# Patient Record
Sex: FEMALE | Race: WHITE | NOT HISPANIC OR LATINO | Employment: FULL TIME | URBAN - METROPOLITAN AREA
[De-identification: names, ages, dates, MRNs, and addresses within clinical notes are randomized per-mention and may not be internally consistent; named-entity substitution may affect disease eponyms.]

---

## 2017-11-07 ENCOUNTER — TRANSFERRED RECORDS (OUTPATIENT)
Dept: HEALTH INFORMATION MANAGEMENT | Facility: CLINIC | Age: 57
End: 2017-11-07

## 2017-11-07 LAB
CHOLEST SERPL-MCNC: 243 MG/DL
GLUCOSE SERPL-MCNC: 91 MG/DL (ref 65–99)
HBA1C MFR BLD: 5.5 % (ref 4–6)
HDLC SERPL-MCNC: 113 MG/DL
LDLC SERPL CALC-MCNC: 115 MG/DL
NONHDLC SERPL-MCNC: 130 MG/DL
TRIGL SERPL-MCNC: 62 MG/DL
TSH SERPL-ACNC: 2.56 UIU/ML (ref 0.3–5)

## 2017-12-15 DIAGNOSIS — F33.42 RECURRENT MAJOR DEPRESSIVE DISORDER, IN FULL REMISSION (H): ICD-10-CM

## 2017-12-15 RX ORDER — BUPROPION HYDROCHLORIDE 300 MG/1
TABLET ORAL
Qty: 90 TABLET | Refills: 0 | Status: SHIPPED | OUTPATIENT
Start: 2017-12-15 | End: 2017-12-22

## 2017-12-15 NOTE — TELEPHONE ENCOUNTER
wellbutrin XL       Last Written Prescription Date:  12/20/16  Last Fill Quantity: 90,   # refills: 3  Last Office Visit: 12/20/16  Future Office visit:    Next 5 appointments (look out 90 days)     Dec 22, 2017 11:30 AM CST   PHYSICAL with Moreno Hagan MD   [facility] ([facility])    [address] [address]   [phone]                   Medication is being filled for 1 time refill only due to:  Patient needs to be seen because it has been more than one year since last visit.   Pt due for annual, appt scheduled,3 month supply sent for insurance purposes.

## 2017-12-22 ENCOUNTER — OFFICE VISIT (OUTPATIENT)
Dept: OBGYN | Facility: CLINIC | Age: 57
End: 2017-12-22
Payer: COMMERCIAL

## 2017-12-22 ENCOUNTER — RADIANT APPOINTMENT (OUTPATIENT)
Dept: MAMMOGRAPHY | Facility: CLINIC | Age: 57
End: 2017-12-22
Payer: COMMERCIAL

## 2017-12-22 VITALS
SYSTOLIC BLOOD PRESSURE: 104 MMHG | DIASTOLIC BLOOD PRESSURE: 72 MMHG | BODY MASS INDEX: 27.82 KG/M2 | HEART RATE: 76 BPM | WEIGHT: 167 LBS | HEIGHT: 65 IN

## 2017-12-22 DIAGNOSIS — D03.30 MELANOMA IN SITU OF FACE EXCLUDING EYELID, NOSE, LIP, AND EAR (H): ICD-10-CM

## 2017-12-22 DIAGNOSIS — Z12.31 VISIT FOR SCREENING MAMMOGRAM: ICD-10-CM

## 2017-12-22 DIAGNOSIS — Z01.419 ENCOUNTER FOR GYNECOLOGICAL EXAMINATION WITHOUT ABNORMAL FINDING: Primary | ICD-10-CM

## 2017-12-22 DIAGNOSIS — F33.42 RECURRENT MAJOR DEPRESSIVE DISORDER, IN FULL REMISSION (H): ICD-10-CM

## 2017-12-22 PROCEDURE — 77063 BREAST TOMOSYNTHESIS BI: CPT | Mod: TC

## 2017-12-22 PROCEDURE — 87624 HPV HI-RISK TYP POOLED RSLT: CPT | Performed by: OBSTETRICS & GYNECOLOGY

## 2017-12-22 PROCEDURE — G0202 SCR MAMMO BI INCL CAD: HCPCS | Mod: TC

## 2017-12-22 PROCEDURE — 99396 PREV VISIT EST AGE 40-64: CPT | Performed by: OBSTETRICS & GYNECOLOGY

## 2017-12-22 PROCEDURE — G0145 SCR C/V CYTO,THINLAYER,RESCR: HCPCS | Performed by: OBSTETRICS & GYNECOLOGY

## 2017-12-22 RX ORDER — BUPROPION HYDROCHLORIDE 300 MG/1
300 TABLET ORAL EVERY MORNING
Qty: 90 TABLET | Refills: 3 | Status: SHIPPED | OUTPATIENT
Start: 2017-12-22 | End: 2019-01-08

## 2017-12-22 ASSESSMENT — PATIENT HEALTH QUESTIONNAIRE - PHQ9
5. POOR APPETITE OR OVEREATING: NOT AT ALL
SUM OF ALL RESPONSES TO PHQ QUESTIONS 1-9: 0

## 2017-12-22 ASSESSMENT — ANXIETY QUESTIONNAIRES
5. BEING SO RESTLESS THAT IT IS HARD TO SIT STILL: NOT AT ALL
7. FEELING AFRAID AS IF SOMETHING AWFUL MIGHT HAPPEN: NOT AT ALL
6. BECOMING EASILY ANNOYED OR IRRITABLE: SEVERAL DAYS
2. NOT BEING ABLE TO STOP OR CONTROL WORRYING: SEVERAL DAYS
GAD7 TOTAL SCORE: 3
3. WORRYING TOO MUCH ABOUT DIFFERENT THINGS: NOT AT ALL
IF YOU CHECKED OFF ANY PROBLEMS ON THIS QUESTIONNAIRE, HOW DIFFICULT HAVE THESE PROBLEMS MADE IT FOR YOU TO DO YOUR WORK, TAKE CARE OF THINGS AT HOME, OR GET ALONG WITH OTHER PEOPLE: NOT DIFFICULT AT ALL
1. FEELING NERVOUS, ANXIOUS, OR ON EDGE: SEVERAL DAYS

## 2017-12-22 NOTE — PROGRESS NOTES
Faustina is a 57 year old  female who presents for annual exam.     Besides routine health maintenance, she has no other health concerns today .    HPI:  The patient's PCP is [facility].    Doing well. Had Dx this year of Melanoma in Situ on Face. Had mohs surgery. No further treatment.  Seeing Derm every 6 months.   Sees Tam at Endocrine. Labs good. Lipids normal.    Mood good on Wellbutrin. No side effects.       GYNECOLOGIC HISTORY:    No LMP recorded. Patient is postmenopausal.  Her current contraception method is: none.  She  reports that she has never smoked. She has never used smokeless tobacco.      Patient is sexually active.  STD testing offered?  Declined  Last PHQ-9 score on record =   PHQ-9 SCORE 2017   Total Score 0     Last GAD7 score on record =   LOW-7 SCORE 2017   Total Score 3     Alcohol Score = 4    HEALTH MAINTENANCE:  Cholesterol: HDL of 113  Cholesterol   Date Value Ref Range Status   2017 243 (H) <200 mg/dL Final   2015 259 (H) <200 mg/dL Final     Comment:     Desirable:       <200 mg/dl      Last Mammo: 2013, Result: normal, Next Mammo: today   Pap: 2012 Neg, HPV Neg  Colonoscopy:  2009, Result: normal, Next Colonoscopy: 2 years.  Dexa:  NA    Health maintenance updated:  yes    HISTORY:  Obstetric History       T2      L2     SAB2   TAB0   Ectopic0   Multiple0   Live Births0       # Outcome Date GA Lbr Nadir/2nd Weight Sex Delivery Anes PTL Lv   4 Term            3 Term            2 SAB            1 SAB                   Patient Active Problem List   Diagnosis     Allergic rhinitis due to pollen     Recurrent major depressive disorder, in full remission (H)     Melanoma in situ of face excluding eyelid, nose, lip, and ear (H)     Past Surgical History:   Procedure Laterality Date     BIOPSY BREAST       EYE SURGERY      retinal detachment      Social History   Substance Use Topics     Smoking status: Never  "Smoker     Smokeless tobacco: Never Used     Alcohol use 0.0 oz/week     0 Standard drinks or equivalent per week      Comment: daily 1-2 per time       Problem (# of Occurrences) Relation (Name,Age of Onset)    Alzheimer Disease (1) Other    DIABETES (1) Other    HEART DISEASE (1) Father (50)    Hyperlipidemia (1) Other    Hypertension (1) Father            Current Outpatient Prescriptions   Medication Sig     buPROPion (WELLBUTRIN XL) 300 MG 24 hr tablet Take 1 tablet (300 mg) by mouth every morning     Omega-3 Fatty Acids (OMEGA-3 FISH OIL PO)      ASPIRIN PO Take 81 mg by mouth     budesonide (RINOCORT AQUA) 32 MCG/ACT nasal spray Spray 1 spray into both nostrils daily     Calcium Carbonate-Vitamin D (CALCIUM + D PO)      MULTIPLE VITAMIN PO      [DISCONTINUED] buPROPion (WELLBUTRIN XL) 300 MG 24 hr tablet TAKE 1 TABLET (300 MG) BY MOUTH EVERY MORNING     No current facility-administered medications for this visit.      Allergies   Allergen Reactions     Bacitracin-Neomycin-Polymyxin      PN: LW Reaction: Contact Dermatitis       Past medical, surgical, social and family histories were reviewed and updated in EPIC.    ROS:   12 point review of systems negative other than symptoms noted below.    EXAM:  /72  Pulse 76  Ht 5' 4.5\" (1.638 m)  Wt 167 lb (75.8 kg)  Breastfeeding? No  BMI 28.22 kg/m2   BMI: Body mass index is 28.22 kg/(m^2).    PHYSICAL EXAM:  Constitutional:  Appearance: Well nourished, well developed, alert, in no acute distress  Neck:  Lymph Nodes:  No lymphadenopathy present    Thyroid:  Gland size normal, nontender, no nodules or masses present  on palpation  Chest:  Respiratory Effort:  Breathing unlabored  Cardiovascular:    Heart: Auscultation:  Regular rate, normal rhythm, no murmurs present  Breasts: Inspection of Breasts:  No lymphadenopathy present., Palpation of Breasts and Axillae:  No masses present on palpation, no breast tenderness., Axillary Lymph Nodes:  No lymphadenopathy " present. and No nodularity, asymmetry or nipple discharge bilaterally.  Gastrointestinal:   Abdominal Examination:  Abdomen nontender to palpation, tone normal without rigidity or guarding, no masses present, umbilicus without lesions   Liver and Spleen:  No hepatomegaly present, liver nontender to palpation    Hernias:  No hernias present  Lymphatic: Lymph Nodes:  No other lymphadenopathy present  Skin:  General Inspection:  No rashes present, no lesions present, no areas of  discoloration    Genitalia and Groin:  No rashes present, no lesions present, no areas of  discoloration, no masses present  Neurologic/Psychiatric:    Mental Status:  Oriented X3     Pelvic Exam:  External Genitalia:     Normal appearance for age, no discharge present, no tenderness present, no inflammatory lesions present, color normal  Vagina:     Normal vaginal vault without central or paravaginal defects, no discharge present, no inflammatory lesions present, no masses present  Bladder:     Nontender to palpation  Urethra:   Urethral Body:  Urethra palpation normal, urethra structural support normal   Urethral Meatus:  No erythema or lesions present  Cervix:     Appearance healthy, no lesions present, nontender to palpation, no bleeding present  Uterus:     Uterus: firm, normal sized and nontender, anteverted in position.   Adnexa:     No adnexal tenderness present, no adnexal masses present  Perineum:     Perineum within normal limits, no evidence of trauma, no rashes or skin lesions present  Anus:     Anus within normal limits, no hemorrhoids present  Inguinal Lymph Nodes:     No lymphadenopathy present  Pubic Hair:     Normal pubic hair distribution for age  Genitalia and Groin:     No rashes present, no lesions present, no areas of discoloration, no masses present      COUNSELING:   Reviewed preventive health counseling, as reflected in patient instructions       Regular exercise    BMI: Body mass index is 28.22 kg/(m^2).  Weight  management plan: Encouraged weight loss    ASSESSMENT:  57 year old female with satisfactory annual exam.    ICD-10-CM    1. Encounter for gynecological examination without abnormal finding Z01.419 Pap imaged thin layer screen with HPV - recommended age 30 - 65     HPV High Risk Types DNA Cervical   2. Recurrent major depressive disorder, in full remission (H) F33.42 buPROPion (WELLBUTRIN XL) 300 MG 24 hr tablet   3. Melanoma in situ of face excluding eyelid, nose, lip, and ear (H) D03.30        PLAN:  Refill meds. RTC 1 year.    Moreno Hagan MD

## 2017-12-22 NOTE — LETTER
January 6, 2018    Faustina Parks Carmen  [address] [place] [place] [address]    Dear Faustina,  We are happy to inform you that your PAP smear result from 12/22/17 is normal.  We are now able to do a follow up test on PAP smears. The DNA test is for HPV (Human Papilloma Virus). Cervical cancer is closely linked with certain types of HPV. Your result showed no evidence of high risk HPV.  Therefore we recommend you return in 3 years for your next pap smear.  You will still need to return to the clinic every year for an annual exam and other preventive tests.  Please contact the clinic at [phone] with any questions.  Sincerely,    Moreno Hagan MD/moe

## 2017-12-23 ASSESSMENT — ANXIETY QUESTIONNAIRES: GAD7 TOTAL SCORE: 3

## 2017-12-27 LAB
COPATH REPORT: NORMAL
PAP: NORMAL

## 2017-12-28 ENCOUNTER — HOSPITAL ENCOUNTER (OUTPATIENT)
Dept: MAMMOGRAPHY | Facility: CLINIC | Age: 57
Discharge: HOME | End: 2017-12-28
Attending: OBSTETRICS & GYNECOLOGY | Admitting: OBSTETRICS & GYNECOLOGY
Payer: COMMERCIAL

## 2017-12-28 DIAGNOSIS — R92.8 ABNORMAL MAMMOGRAM: ICD-10-CM

## 2017-12-28 PROCEDURE — 76642 ULTRASOUND BREAST LIMITED: CPT | Mod: LT

## 2017-12-29 LAB
FINAL DIAGNOSIS: NORMAL
HPV HR 12 DNA CVX QL NAA+PROBE: NEGATIVE
HPV16 DNA SPEC QL NAA+PROBE: NEGATIVE
HPV18 DNA SPEC QL NAA+PROBE: NEGATIVE
SPECIMEN DESCRIPTION: NORMAL

## 2018-04-09 ENCOUNTER — TELEPHONE (OUTPATIENT)
Dept: OBGYN | Facility: CLINIC | Age: 58
End: 2018-04-09

## 2018-04-09 NOTE — TELEPHONE ENCOUNTER
12/22/17 Annual Dentist found lump under her ear. Pt was wondering if she could come in and be seen for this. Advised pt that it is more an PCP or internal clinic appointment. Pt states she made an appointment with ENT an wondered if this was right. Informed that pt could be seen by ENT. Offered [facility] number, pt decline. Will keep appointment with ENT.

## 2018-04-09 NOTE — TELEPHONE ENCOUNTER
Patient has lump on neck that was found by her dentist, wants to know if she can see Dr. Hagan regarding this or if she needs to see another provider.

## 2018-12-07 ENCOUNTER — TRANSFERRED RECORDS (OUTPATIENT)
Dept: HEALTH INFORMATION MANAGEMENT | Facility: CLINIC | Age: 58
End: 2018-12-07

## 2019-01-07 NOTE — PROGRESS NOTES
Faustina is a 58 year old  female who presents for annual exam.     Besides routine health maintenance, she has no other health concerns today .    HPI:  The patient's PCP is  [facility].    Having more anxiety which results in tremor.  Starting to become an issue.   Has been on Wellbutrin for years.     Labs at work. High HDL    No GYN issues.   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      GYNECOLOGIC HISTORY:    No LMP recorded. Patient is postmenopausal.  Her current contraception method is: menopause.  She  reports that  has never smoked. she has never used smokeless tobacco.    Patient is sexually active.  STD testing offered?  Declined  Last PHQ-9 score on record =   PHQ-9 SCORE 2019   PHQ-9 Total Score 5     Last GAD7 score on record =   LOW-7 SCORE 2019   Total Score 6     Alcohol Score = 4    HEALTH MAINTENANCE:  Cholesterol: 2017   Total= 243, Triglycerides=62, GIB=107, VSR=481  Cholesterol   Date Value Ref Range Status   2017 243 (H) <200 mg/dL Final   2015 259 (H) <200 mg/dL Final     Comment:     Desirable:       <200 mg/dl      Last Mammo: 2017, Result: normal, Next Mammo: today   Pap: 2017 NIL, HPV-  Lab Results   Component Value Date    PAP NIL 2017      Colonoscopy:  2009, Result: normal, Next Colonoscopy: 10 years.  Dexa:  NA    Health maintenance updated:  yes    HISTORY:  Obstetric History       T2      L2     SAB2   TAB0   Ectopic0   Multiple0   Live Births0       # Outcome Date GA Lbr Nadir/2nd Weight Sex Delivery Anes PTL Lv   4 Term            3 Term            2 SAB            1 SAB                   Patient Active Problem List   Diagnosis     Allergic rhinitis due to pollen     Recurrent major depressive disorder, in full remission (H)     Melanoma in situ of face excluding eyelid, nose, lip, and ear (H)     Past Surgical History:   Procedure Laterality Date     BIOPSY BREAST       EYE  "SURGERY      retinal detachment      Social History     Tobacco Use     Smoking status: Never Smoker     Smokeless tobacco: Never Used   Substance Use Topics     Alcohol use: Yes     Alcohol/week: 0.0 oz     Comment: daily 1-2 per time       Problem (# of Occurrences) Relation (Name,Age of Onset)    Alzheimer Disease (1) Other    Diabetes (1) Other    Heart Disease (1) Father (50)    Hyperlipidemia (1) Other    Hypertension (1) Father            Current Outpatient Medications   Medication Sig     ASPIRIN PO Take 81 mg by mouth     budesonide (RINOCORT AQUA) 32 MCG/ACT nasal spray Spray 1 spray into both nostrils daily     buPROPion (WELLBUTRIN XL) 300 MG 24 hr tablet Take 1 tablet (300 mg) by mouth every morning     Calcium Carbonate-Vitamin D (CALCIUM + D PO)      escitalopram (LEXAPRO) 10 MG tablet Take 1 tablet (10 mg) by mouth daily     MULTIPLE VITAMIN PO      Omega-3 Fatty Acids (OMEGA-3 FISH OIL PO)      No current facility-administered medications for this visit.      Allergies   Allergen Reactions     Bacitracin-Neomycin-Polymyxin      PN: LW Reaction: Contact Dermatitis       Past medical, surgical, social and family histories were reviewed and updated in EPIC.    ROS:   12 point review of systems negative other than symptoms noted below.  Genitourinary: Vaginal Dryness and Vaginal Itching  Psychiatric: Anxiety    EXAM:  /70   Pulse 74   Ht 1.626 m (5' 4\")   Wt 78.2 kg (172 lb 6.4 oz)   Breastfeeding? No   BMI 29.59 kg/m     BMI: Body mass index is 29.59 kg/m .    PHYSICAL EXAM:  Constitutional:  Appearance: Well nourished, well developed, alert, in no acute distress  Neck:  Lymph Nodes:  No lymphadenopathy present    Thyroid:  Gland size normal, nontender, no nodules or masses present  on palpation  Chest:  Respiratory Effort:  Breathing unlabored  Cardiovascular:    Heart: Auscultation:  Regular rate, normal rhythm, no murmurs present  Breasts: Inspection of Breasts:  No lymphadenopathy " present., Palpation of Breasts and Axillae:  No masses present on palpation, no breast tenderness., Axillary Lymph Nodes:  No lymphadenopathy present. and No nodularity, asymmetry or nipple discharge bilaterally.  Gastrointestinal:   Abdominal Examination:  Abdomen nontender to palpation, tone normal without rigidity or guarding, no masses present, umbilicus without lesions   Liver and Spleen:  No hepatomegaly present, liver nontender to palpation    Hernias:  No hernias present  Lymphatic: Lymph Nodes:  No other lymphadenopathy present  Skin:  General Inspection:  No rashes present, no lesions present, no areas of  discoloration    Genitalia and Groin:  No rashes present, no lesions present, no areas of  discoloration, no masses present  Neurologic/Psychiatric:    Mental Status:  Oriented X3     Pelvic Exam:  External Genitalia:     Normal appearance for age, no discharge present, no tenderness present, no inflammatory lesions present, color normal  Vagina:     Normal vaginal vault without central or paravaginal defects, no discharge present, no inflammatory lesions present, no masses present  Bladder:     Nontender to palpation  Urethra:   Urethral Body:  Urethra palpation normal, urethra structural support normal   Urethral Meatus:  No erythema or lesions present  Cervix:     Appearance healthy, no lesions present, nontender to palpation, no bleeding present  Uterus:     Uterus: firm, normal sized and nontender, anteverted in position.   Adnexa:     No adnexal tenderness present, no adnexal masses present  Perineum:     Perineum within normal limits, no evidence of trauma, no rashes or skin lesions present  Anus:     Anus within normal limits, no hemorrhoids present  Inguinal Lymph Nodes:     No lymphadenopathy present  Pubic Hair:     Normal pubic hair distribution for age  Genitalia and Groin:     No rashes present, no lesions present, no areas of discoloration, no masses present      COUNSELING:   Reviewed  preventive health counseling, as reflected in patient instructions       Regular exercise    BMI: Body mass index is 29.59 kg/m .  Weight management plan: Encouraged weight loss    ASSESSMENT:  58 year old female with satisfactory annual exam.    ICD-10-CM    1. Encounter for gynecological examination without abnormal finding Z01.419    2. Recurrent major depressive disorder, in full remission (H) F33.42 buPROPion (WELLBUTRIN XL) 300 MG 24 hr tablet     escitalopram (LEXAPRO) 10 MG tablet   3. Need for shingles vaccine Z23 ZOSTER VACCINE RECOMBINANT ADJUVANTED IM NJX     ADMIN 1st VACCINE     ADMIN 1st VACCINE     ZOSTER VACCINE RECOMBINANT ADJUVANTED IM NJX       PLAN:  Will add Lexapro to the wellbutrin for anxiety. PHQ-9 is not great so could benefit from both. Will call after Rx to let me know how shes doing.   ShingRix today. Will RTC in a few months for second injection.     Moreno Hagan MD

## 2019-01-08 ENCOUNTER — OFFICE VISIT (OUTPATIENT)
Dept: OBGYN | Facility: CLINIC | Age: 59
End: 2019-01-08
Payer: COMMERCIAL

## 2019-01-08 ENCOUNTER — ANCILLARY PROCEDURE (OUTPATIENT)
Dept: MAMMOGRAPHY | Facility: CLINIC | Age: 59
End: 2019-01-08
Payer: COMMERCIAL

## 2019-01-08 VITALS
WEIGHT: 172.4 LBS | SYSTOLIC BLOOD PRESSURE: 118 MMHG | BODY MASS INDEX: 29.43 KG/M2 | DIASTOLIC BLOOD PRESSURE: 70 MMHG | HEIGHT: 64 IN | HEART RATE: 74 BPM

## 2019-01-08 DIAGNOSIS — Z12.31 VISIT FOR SCREENING MAMMOGRAM: ICD-10-CM

## 2019-01-08 DIAGNOSIS — Z23 NEED FOR SHINGLES VACCINE: ICD-10-CM

## 2019-01-08 DIAGNOSIS — Z01.419 ENCOUNTER FOR GYNECOLOGICAL EXAMINATION WITHOUT ABNORMAL FINDING: Primary | ICD-10-CM

## 2019-01-08 DIAGNOSIS — F33.42 RECURRENT MAJOR DEPRESSIVE DISORDER, IN FULL REMISSION (H): ICD-10-CM

## 2019-01-08 PROCEDURE — 90750 HZV VACC RECOMBINANT IM: CPT | Performed by: OBSTETRICS & GYNECOLOGY

## 2019-01-08 PROCEDURE — 99396 PREV VISIT EST AGE 40-64: CPT | Mod: 25 | Performed by: OBSTETRICS & GYNECOLOGY

## 2019-01-08 PROCEDURE — 77063 BREAST TOMOSYNTHESIS BI: CPT | Mod: TC

## 2019-01-08 PROCEDURE — 90471 IMMUNIZATION ADMIN: CPT | Performed by: OBSTETRICS & GYNECOLOGY

## 2019-01-08 PROCEDURE — 77067 SCR MAMMO BI INCL CAD: CPT | Mod: TC

## 2019-01-08 RX ORDER — ESCITALOPRAM OXALATE 10 MG/1
10 TABLET ORAL DAILY
Qty: 90 TABLET | Refills: 0 | Status: SHIPPED | OUTPATIENT
Start: 2019-01-08 | End: 2019-03-29

## 2019-01-08 RX ORDER — BUPROPION HYDROCHLORIDE 300 MG/1
300 TABLET ORAL EVERY MORNING
Qty: 90 TABLET | Refills: 3 | Status: SHIPPED | OUTPATIENT
Start: 2019-01-08 | End: 2020-01-24

## 2019-01-08 ASSESSMENT — ANXIETY QUESTIONNAIRES
IF YOU CHECKED OFF ANY PROBLEMS ON THIS QUESTIONNAIRE, HOW DIFFICULT HAVE THESE PROBLEMS MADE IT FOR YOU TO DO YOUR WORK, TAKE CARE OF THINGS AT HOME, OR GET ALONG WITH OTHER PEOPLE: NOT DIFFICULT AT ALL
5. BEING SO RESTLESS THAT IT IS HARD TO SIT STILL: SEVERAL DAYS
7. FEELING AFRAID AS IF SOMETHING AWFUL MIGHT HAPPEN: SEVERAL DAYS
6. BECOMING EASILY ANNOYED OR IRRITABLE: SEVERAL DAYS
1. FEELING NERVOUS, ANXIOUS, OR ON EDGE: SEVERAL DAYS
3. WORRYING TOO MUCH ABOUT DIFFERENT THINGS: SEVERAL DAYS
2. NOT BEING ABLE TO STOP OR CONTROL WORRYING: NOT AT ALL
GAD7 TOTAL SCORE: 6

## 2019-01-08 ASSESSMENT — PATIENT HEALTH QUESTIONNAIRE - PHQ9
5. POOR APPETITE OR OVEREATING: SEVERAL DAYS
SUM OF ALL RESPONSES TO PHQ QUESTIONS 1-9: 5

## 2019-01-08 ASSESSMENT — MIFFLIN-ST. JEOR: SCORE: 1337

## 2019-01-09 ASSESSMENT — ANXIETY QUESTIONNAIRES: GAD7 TOTAL SCORE: 6

## 2019-03-29 ENCOUNTER — TELEPHONE (OUTPATIENT)
Dept: OBGYN | Facility: CLINIC | Age: 59
End: 2019-03-29

## 2019-03-29 DIAGNOSIS — F33.42 RECURRENT MAJOR DEPRESSIVE DISORDER, IN FULL REMISSION (H): ICD-10-CM

## 2019-03-29 RX ORDER — ESCITALOPRAM OXALATE 10 MG/1
10 TABLET ORAL DAILY
Qty: 90 TABLET | Refills: 3 | Status: SHIPPED | OUTPATIENT
Start: 2019-03-29 | End: 2020-01-24

## 2019-03-29 NOTE — TELEPHONE ENCOUNTER
CAlled and left kandy for the pt that her med has been sent to the pharmacy and that she can schedule a shingles booster anytime after April 29th ( Okay per tejas )

## 2019-03-29 NOTE — TELEPHONE ENCOUNTER
Patient calling with update for Lexapro trial, she reports it is working well and would like to continue the rx.  [facility] [address].

## 2019-03-29 NOTE — TELEPHONE ENCOUNTER
Called the pt- She says she feels very good since starting the lexapro. Would like a refill. [facility] [address]    Routing to Dr ALLEN- Okay to reorder?      (Pt questioning when she can come in for her 2nd booster for shingles. Is aware of shortage.  I discussed w/ Kayln in Lab- Pt should sched week of April 29th.- Pls pass info onto pt after Dr ALLEN responds)

## 2019-04-15 DIAGNOSIS — R05.9 COUGH: Primary | ICD-10-CM

## 2019-04-15 RX ORDER — BENZONATATE 100 MG/1
100 CAPSULE ORAL 3 TIMES DAILY PRN
Qty: 21 CAPSULE | Refills: 0 | Status: SHIPPED | OUTPATIENT
Start: 2019-04-15 | End: 2020-01-24

## 2019-04-15 NOTE — TELEPHONE ENCOUNTER
Pt has a bad cough and cold. Some sinus drainage. Cough not productive, no fever. Pt is requesting refill of cough med she received a couple of years ago [facility]. Benzonatate 100 mg capsules. Pt states it worked well for her. Routing to Dr. Tristan, on call. Please advise.

## 2019-04-19 ENCOUNTER — TELEPHONE (OUTPATIENT)
Dept: OBGYN | Facility: CLINIC | Age: 59
End: 2019-04-19

## 2019-04-19 NOTE — TELEPHONE ENCOUNTER
Pt had shingles shot at [facility] yesterday. Has redness at site and some bruising. Pt informed that bruising is from small vessel hit with injection and will go away. Pt does full range of motion in her arm. Pt informed normal reaction. Can just monitor for now, use heat to site. Can try ice if heat not helpful. Pt to call back if develops SOB, Limited range of motion in arm, fever or increased redness. No further questions.

## 2019-06-24 ENCOUNTER — TRANSFERRED RECORDS (OUTPATIENT)
Dept: HEALTH INFORMATION MANAGEMENT | Facility: CLINIC | Age: 59
End: 2019-06-24

## 2019-10-18 ENCOUNTER — TELEPHONE (OUTPATIENT)
Dept: OBGYN | Facility: CLINIC | Age: 59
End: 2019-10-18

## 2019-10-18 DIAGNOSIS — R05.9 COUGH: Primary | ICD-10-CM

## 2019-10-18 RX ORDER — BENZONATATE 100 MG/1
100 CAPSULE ORAL 3 TIMES DAILY PRN
Qty: 21 CAPSULE | Refills: 0 | Status: SHIPPED | OUTPATIENT
Start: 2019-10-18 | End: 2020-01-24

## 2019-10-18 NOTE — TELEPHONE ENCOUNTER
Requesting a prescription (refill) for Benzonatate 100mg tablet for her cough. [facility] on [address]. Please call patient @ [phone]

## 2019-10-18 NOTE — TELEPHONE ENCOUNTER
Symptoms of sore throat, nasal congestion, cough-non productive, denies fever. Cough is the most bothersome. Has used Benzonatate in the past with good results.     Norma Alvarez RN on 10/18/2019 at 11:09 AM

## 2019-12-05 ENCOUNTER — TRANSFERRED RECORDS (OUTPATIENT)
Dept: HEALTH INFORMATION MANAGEMENT | Facility: CLINIC | Age: 59
End: 2019-12-05

## 2019-12-05 LAB
CHOLEST SERPL-MCNC: 260 MG/DL
GLUCOSE SERPL-MCNC: 90 MG/DL (ref 65–99)
HBA1C MFR BLD: 6 % (ref 4–6)
HDLC SERPL-MCNC: 102 MG/DL
LDLC SERPL CALC-MCNC: 143 MG/DL
NONHDLC SERPL-MCNC: 158 MG/DL
TRIGL SERPL-MCNC: 49 MG/DL
TSH SERPL-ACNC: 3.19 UIU/ML (ref 0.3–5)

## 2020-01-23 NOTE — PROGRESS NOTES
Faustina is a 59 year old  female who presents for annual exam.     Besides routine health maintenance, last year added Lexapro to Wellbutrin which has helped, but still feels shaky at times and unsure why.    HPI:    Doing well. Mood good with Lexapro added to Wellbutrin.   Notes tremor over the last 2 years. Has been on Wellbutrin much longer than that. Also notes brothers have same tremor.  No GYN complaints. Bladder good.     Sees endocrine for goiter. Does lipids.   Renting condo in  [place] for a month in March.       GYNECOLOGIC HISTORY:    No LMP recorded. Patient is postmenopausal.  She  reports that she has never smoked. She has never used smokeless tobacco.  Patient is not sexually active.  STD testing offered?  Declined     Last PHQ-9 score on record =   PHQ-9 SCORE 2020   PHQ-9 Total Score 0     Last GAD7 score on record =   LOW-7 SCORE 2020   Total Score 0     Alcohol Score = 4    HEALTH MAINTENANCE:  Cholesterol:   Recent Labs   Lab Test 19   CHOL 260* 243*    113   * 115*   TRIG 49 62   GLUCOSE 90 91   Last Mammo: 19, Result: Normal, Next Mammo: Today   Pap:   Lab Results   Component Value Date    PAP NIL, NEG-HPV 2017   Colonoscopy:  19, Result: Normal, but poor prep, Next Colonoscopy: 5 years.  Dexa:  never  Health maintenance updated:  yes    HISTORY:  OB History    Para Term  AB Living   4 2 2 0 2 2   SAB TAB Ectopic Multiple Live Births   2 0 0 0 0      # Outcome Date GA Lbr Nadir/2nd Weight Sex Delivery Anes PTL Lv   4 Term            3 Term            2 SAB            1 SAB                Patient Active Problem List   Diagnosis     Allergic rhinitis due to pollen     Recurrent major depressive disorder, in full remission (H)     Melanoma in situ of face excluding eyelid, nose, lip, and ear (H)     Past Surgical History:   Procedure Laterality Date     BIOPSY BREAST       EYE SURGERY      retinal detachment     PAROTIDECTOMY  "Left 2018      Social History     Tobacco Use     Smoking status: Never Smoker     Smokeless tobacco: Never Used   Substance Use Topics     Alcohol use: Yes     Alcohol/week: 0.0 standard drinks     Comment: daily 1-2 per time       Problem (# of Occurrences) Relation (Name,Age of Onset)    Alzheimer Disease (1) Other    Diabetes (1) Other    Heart Disease (1) Father (50)    Hyperlipidemia (1) Other    Hypertension (1) Father            Current Outpatient Medications   Medication Sig     budesonide (RINOCORT AQUA) 32 MCG/ACT nasal spray Spray 1 spray into both nostrils daily     buPROPion (WELLBUTRIN XL) 300 MG 24 hr tablet Take 1 tablet (300 mg) by mouth every morning     Calcium Carbonate-Vitamin D (CALCIUM + D PO)      escitalopram (LEXAPRO) 10 MG tablet Take 1 tablet (10 mg) by mouth daily     MULTIPLE VITAMIN PO      Omega-3 Fatty Acids (OMEGA-3 FISH OIL PO)      No current facility-administered medications for this visit.      Allergies   Allergen Reactions     Bacitracin-Neomycin-Polymyxin      PN: LW Reaction: Contact Dermatitis       Past medical, surgical, social and family histories were reviewed and updated in EPIC.    ROS:   12 point review of systems negative other than symptoms noted below or in the HPI.  Psychiatric: Anxiety and Depression      EXAM:  /60   Ht 1.638 m (5' 4.5\")   Wt 78.5 kg (173 lb)   BMI 29.24 kg/m     BMI: Body mass index is 29.24 kg/m .    PHYSICAL EXAM:  Constitutional:   Appearance: Well nourished, well developed, alert, in no acute distress  Neck:  Lymph Nodes:  No lymphadenopathy present    Thyroid:  Gland size normal, nontender, no nodules or masses present  on palpation  Chest:  Respiratory Effort:  Breathing unlabored  Cardiovascular:    Heart: Auscultation:  Regular rate, normal rhythm, no murmurs present  Breasts: Inspection of Breasts:  No lymphadenopathy present., Palpation of Breasts and Axillae:  No masses present on palpation, no breast tenderness., Axillary " Lymph Nodes:  No lymphadenopathy present. and No nodularity, asymmetry or nipple discharge bilaterally.  Gastrointestinal:   Abdominal Examination:  Abdomen nontender to palpation, tone normal without rigidity or guarding, no masses present, umbilicus without lesions   Liver and Spleen:  No hepatomegaly present, liver nontender to palpation    Hernias:  No hernias present  Lymphatic: Lymph Nodes:  No other lymphadenopathy present  Skin:  General Inspection:  No rashes present, no lesions present, no areas of  discoloration  Neurologic:    Mental Status:  Oriented X3.  Normal strength and tone, sensory exam                grossly normal, mentation intact and speech normal.    Psychiatric:   Mentation appears normal and affect normal/bright.         Pelvic Exam:  External Genitalia:     Normal appearance for age, no discharge present, no tenderness present, no inflammatory lesions present, color normal  Vagina:     Normal vaginal vault without central or paravaginal defects, no discharge present, no inflammatory lesions present, no masses present  Bladder:     Nontender to palpation  Urethra:   Urethral Body:  Urethra palpation normal, urethra structural support normal   Urethral Meatus:  No erythema or lesions present  Cervix:     Appearance healthy, no lesions present, nontender to palpation, no bleeding present  Uterus:     Uterus: firm, normal sized and nontender, anteverted in position.   Adnexa:     No adnexal tenderness present, no adnexal masses present  Perineum:     Perineum within normal limits, no evidence of trauma, no rashes or skin lesions present  Anus:     Anus within normal limits, no hemorrhoids present  Inguinal Lymph Nodes:     No lymphadenopathy present  Pubic Hair:     Normal pubic hair distribution for age  Genitalia and Groin:     No rashes present, no lesions present, no areas of discoloration, no masses present      COUNSELING:   Reviewed preventive health counseling, as reflected in patient  instructions       Regular exercise    BMI: Body mass index is 29.24 kg/m .  Weight management plan: Encouraged weight loss    ASSESSMENT:  59 year old female with satisfactory annual exam.    ICD-10-CM    1. Encounter for gynecological examination without abnormal finding Z01.419    2. Recurrent major depressive disorder, in full remission (H) F33.42 escitalopram (LEXAPRO) 10 MG tablet     buPROPion (WELLBUTRIN XL) 300 MG 24 hr tablet   3. Need for Tdap vaccination Z23 TDAP, IM (10 - 64 YRS) - Adacel     ADMIN 1st VACCINE       PLAN:  Will continue both lexapro and wellbutrin.  Endo watching lipids.       Moreno Hagan MD

## 2020-01-24 ENCOUNTER — ANCILLARY PROCEDURE (OUTPATIENT)
Dept: MAMMOGRAPHY | Facility: CLINIC | Age: 60
End: 2020-01-24
Payer: COMMERCIAL

## 2020-01-24 ENCOUNTER — OFFICE VISIT (OUTPATIENT)
Dept: OBGYN | Facility: CLINIC | Age: 60
End: 2020-01-24
Payer: COMMERCIAL

## 2020-01-24 VITALS
WEIGHT: 173 LBS | BODY MASS INDEX: 28.82 KG/M2 | SYSTOLIC BLOOD PRESSURE: 122 MMHG | HEIGHT: 65 IN | DIASTOLIC BLOOD PRESSURE: 60 MMHG

## 2020-01-24 DIAGNOSIS — Z01.419 ENCOUNTER FOR GYNECOLOGICAL EXAMINATION WITHOUT ABNORMAL FINDING: Primary | ICD-10-CM

## 2020-01-24 DIAGNOSIS — Z12.31 VISIT FOR SCREENING MAMMOGRAM: ICD-10-CM

## 2020-01-24 DIAGNOSIS — F33.42 RECURRENT MAJOR DEPRESSIVE DISORDER, IN FULL REMISSION (H): ICD-10-CM

## 2020-01-24 DIAGNOSIS — Z23 NEED FOR TDAP VACCINATION: ICD-10-CM

## 2020-01-24 PROCEDURE — 90471 IMMUNIZATION ADMIN: CPT | Performed by: OBSTETRICS & GYNECOLOGY

## 2020-01-24 PROCEDURE — 90715 TDAP VACCINE 7 YRS/> IM: CPT | Performed by: OBSTETRICS & GYNECOLOGY

## 2020-01-24 PROCEDURE — 77067 SCR MAMMO BI INCL CAD: CPT | Mod: TC

## 2020-01-24 PROCEDURE — 99396 PREV VISIT EST AGE 40-64: CPT | Mod: 25 | Performed by: OBSTETRICS & GYNECOLOGY

## 2020-01-24 PROCEDURE — 77063 BREAST TOMOSYNTHESIS BI: CPT | Mod: TC

## 2020-01-24 RX ORDER — ESCITALOPRAM OXALATE 10 MG/1
10 TABLET ORAL DAILY
Qty: 90 TABLET | Refills: 3 | Status: SHIPPED | OUTPATIENT
Start: 2020-01-24 | End: 2021-02-15

## 2020-01-24 RX ORDER — BUPROPION HYDROCHLORIDE 300 MG/1
300 TABLET ORAL EVERY MORNING
Qty: 90 TABLET | Refills: 3 | Status: SHIPPED | OUTPATIENT
Start: 2020-01-24 | End: 2021-02-15

## 2020-01-24 ASSESSMENT — ANXIETY QUESTIONNAIRES
2. NOT BEING ABLE TO STOP OR CONTROL WORRYING: NOT AT ALL
7. FEELING AFRAID AS IF SOMETHING AWFUL MIGHT HAPPEN: NOT AT ALL
1. FEELING NERVOUS, ANXIOUS, OR ON EDGE: NOT AT ALL
GAD7 TOTAL SCORE: 0
IF YOU CHECKED OFF ANY PROBLEMS ON THIS QUESTIONNAIRE, HOW DIFFICULT HAVE THESE PROBLEMS MADE IT FOR YOU TO DO YOUR WORK, TAKE CARE OF THINGS AT HOME, OR GET ALONG WITH OTHER PEOPLE: NOT DIFFICULT AT ALL
3. WORRYING TOO MUCH ABOUT DIFFERENT THINGS: NOT AT ALL
5. BEING SO RESTLESS THAT IT IS HARD TO SIT STILL: NOT AT ALL
6. BECOMING EASILY ANNOYED OR IRRITABLE: NOT AT ALL

## 2020-01-24 ASSESSMENT — PATIENT HEALTH QUESTIONNAIRE - PHQ9
SUM OF ALL RESPONSES TO PHQ QUESTIONS 1-9: 0
5. POOR APPETITE OR OVEREATING: NOT AT ALL

## 2020-01-24 ASSESSMENT — MIFFLIN-ST. JEOR: SCORE: 1342.66

## 2020-01-25 ASSESSMENT — ANXIETY QUESTIONNAIRES: GAD7 TOTAL SCORE: 0

## 2020-12-11 ENCOUNTER — TRANSFERRED RECORDS (OUTPATIENT)
Dept: HEALTH INFORMATION MANAGEMENT | Facility: CLINIC | Age: 60
End: 2020-12-11

## 2020-12-11 LAB
CHOLEST SERPL-MCNC: 265 MG/DL
GLUCOSE SERPL-MCNC: 100 MG/DL (ref 65–99)
HBA1C MFR BLD: 5.7 % (ref 4–6)
HDLC SERPL-MCNC: 102 MG/DL
LDLC SERPL CALC-MCNC: 144 MG/DL
NONHDLC SERPL-MCNC: 163 MG/DL
TRIGL SERPL-MCNC: 84 MG/DL
TSH SERPL-ACNC: 1.54 ULU/ML (ref 0.3–5)

## 2021-01-15 ENCOUNTER — HEALTH MAINTENANCE LETTER (OUTPATIENT)
Age: 61
End: 2021-01-15

## 2021-02-03 ENCOUNTER — APPOINTMENT (OUTPATIENT)
Dept: GENERAL RADIOLOGY | Facility: CLINIC | Age: 61
End: 2021-02-03
Attending: EMERGENCY MEDICINE
Payer: COMMERCIAL

## 2021-02-03 ENCOUNTER — HOSPITAL ENCOUNTER (INPATIENT)
Facility: CLINIC | Age: 61
LOS: 3 days | Discharge: HOME HEALTH | End: 2021-02-06
Attending: EMERGENCY MEDICINE | Admitting: INTERNAL MEDICINE
Payer: COMMERCIAL

## 2021-02-03 DIAGNOSIS — S72.002A CLOSED FRACTURE OF LEFT HIP, INITIAL ENCOUNTER (H): Primary | ICD-10-CM

## 2021-02-03 LAB
ABO + RH BLD: NORMAL
ABO + RH BLD: NORMAL
ANION GAP SERPL CALCULATED.3IONS-SCNC: 12 MMOL/L (ref 3–14)
APTT PPP: 24 SEC (ref 22–37)
BASOPHILS # BLD AUTO: 0.1 10E9/L (ref 0–0.2)
BASOPHILS NFR BLD AUTO: 1.5 %
BLD GP AB SCN SERPL QL: NORMAL
BLOOD BANK CMNT PATIENT-IMP: NORMAL
BUN SERPL-MCNC: 11 MG/DL (ref 7–30)
CALCIUM SERPL-MCNC: 9.2 MG/DL (ref 8.5–10.1)
CHLORIDE SERPL-SCNC: 96 MMOL/L (ref 94–109)
CO2 SERPL-SCNC: 22 MMOL/L (ref 20–32)
CREAT SERPL-MCNC: 0.64 MG/DL (ref 0.52–1.04)
DIFFERENTIAL METHOD BLD: NORMAL
EOSINOPHIL # BLD AUTO: 0.1 10E9/L (ref 0–0.7)
EOSINOPHIL NFR BLD AUTO: 1.8 %
ERYTHROCYTE [DISTWIDTH] IN BLOOD BY AUTOMATED COUNT: 12.6 % (ref 10–15)
GFR SERPL CREATININE-BSD FRML MDRD: >90 ML/MIN/{1.73_M2}
GLUCOSE SERPL-MCNC: 107 MG/DL (ref 70–99)
HCT VFR BLD AUTO: 42.9 % (ref 35–47)
HGB BLD-MCNC: 14.2 G/DL (ref 11.7–15.7)
IMM GRANULOCYTES # BLD: 0 10E9/L (ref 0–0.4)
IMM GRANULOCYTES NFR BLD: 0.5 %
INR PPP: 0.95 (ref 0.86–1.14)
LABORATORY COMMENT REPORT: NORMAL
LYMPHOCYTES # BLD AUTO: 3.2 10E9/L (ref 0.8–5.3)
LYMPHOCYTES NFR BLD AUTO: 48.5 %
MAGNESIUM SERPL-MCNC: 2.2 MG/DL (ref 1.6–2.3)
MCH RBC QN AUTO: 30.5 PG (ref 26.5–33)
MCHC RBC AUTO-ENTMCNC: 33.1 G/DL (ref 31.5–36.5)
MCV RBC AUTO: 92 FL (ref 78–100)
MONOCYTES # BLD AUTO: 0.6 10E9/L (ref 0–1.3)
MONOCYTES NFR BLD AUTO: 9 %
NEUTROPHILS # BLD AUTO: 2.5 10E9/L (ref 1.6–8.3)
NEUTROPHILS NFR BLD AUTO: 38.7 %
NRBC # BLD AUTO: 0 10*3/UL
NRBC BLD AUTO-RTO: 0 /100
PLATELET # BLD AUTO: 326 10E9/L (ref 150–450)
POTASSIUM SERPL-SCNC: 3.9 MMOL/L (ref 3.4–5.3)
RBC # BLD AUTO: 4.66 10E12/L (ref 3.8–5.2)
SARS-COV-2 RNA RESP QL NAA+PROBE: NEGATIVE
SODIUM SERPL-SCNC: 130 MMOL/L (ref 133–144)
SPECIMEN EXP DATE BLD: NORMAL
SPECIMEN SOURCE: NORMAL
WBC # BLD AUTO: 6.5 10E9/L (ref 4–11)

## 2021-02-03 PROCEDURE — 85610 PROTHROMBIN TIME: CPT | Performed by: EMERGENCY MEDICINE

## 2021-02-03 PROCEDURE — 73502 X-RAY EXAM HIP UNI 2-3 VIEWS: CPT

## 2021-02-03 PROCEDURE — 99223 1ST HOSP IP/OBS HIGH 75: CPT | Mod: AI | Performed by: INTERNAL MEDICINE

## 2021-02-03 PROCEDURE — 250N000011 HC RX IP 250 OP 636: Performed by: INTERNAL MEDICINE

## 2021-02-03 PROCEDURE — 83735 ASSAY OF MAGNESIUM: CPT | Performed by: EMERGENCY MEDICINE

## 2021-02-03 PROCEDURE — 85730 THROMBOPLASTIN TIME PARTIAL: CPT | Performed by: EMERGENCY MEDICINE

## 2021-02-03 PROCEDURE — 86850 RBC ANTIBODY SCREEN: CPT | Performed by: EMERGENCY MEDICINE

## 2021-02-03 PROCEDURE — 85025 COMPLETE CBC W/AUTO DIFF WBC: CPT | Performed by: EMERGENCY MEDICINE

## 2021-02-03 PROCEDURE — 86900 BLOOD TYPING SEROLOGIC ABO: CPT | Performed by: EMERGENCY MEDICINE

## 2021-02-03 PROCEDURE — 80048 BASIC METABOLIC PNL TOTAL CA: CPT | Performed by: EMERGENCY MEDICINE

## 2021-02-03 PROCEDURE — 99285 EMERGENCY DEPT VISIT HI MDM: CPT | Mod: 25

## 2021-02-03 PROCEDURE — 120N000001 HC R&B MED SURG/OB

## 2021-02-03 PROCEDURE — C9803 HOPD COVID-19 SPEC COLLECT: HCPCS

## 2021-02-03 PROCEDURE — 86901 BLOOD TYPING SEROLOGIC RH(D): CPT | Performed by: EMERGENCY MEDICINE

## 2021-02-03 PROCEDURE — 250N000011 HC RX IP 250 OP 636: Performed by: EMERGENCY MEDICINE

## 2021-02-03 PROCEDURE — 87635 SARS-COV-2 COVID-19 AMP PRB: CPT | Performed by: EMERGENCY MEDICINE

## 2021-02-03 PROCEDURE — 96374 THER/PROPH/DIAG INJ IV PUSH: CPT

## 2021-02-03 PROCEDURE — 96376 TX/PRO/DX INJ SAME DRUG ADON: CPT

## 2021-02-03 PROCEDURE — 71045 X-RAY EXAM CHEST 1 VIEW: CPT

## 2021-02-03 RX ORDER — FLUTICASONE PROPIONATE 50 MCG
2 SPRAY, SUSPENSION (ML) NASAL DAILY
Status: DISCONTINUED | OUTPATIENT
Start: 2021-02-04 | End: 2021-02-06 | Stop reason: HOSPADM

## 2021-02-03 RX ORDER — NALOXONE HYDROCHLORIDE 0.4 MG/ML
0.4 INJECTION, SOLUTION INTRAMUSCULAR; INTRAVENOUS; SUBCUTANEOUS
Status: DISCONTINUED | OUTPATIENT
Start: 2021-02-03 | End: 2021-02-06 | Stop reason: HOSPADM

## 2021-02-03 RX ORDER — POLYETHYLENE GLYCOL 3350 17 G/17G
17 POWDER, FOR SOLUTION ORAL DAILY PRN
Status: DISCONTINUED | OUTPATIENT
Start: 2021-02-03 | End: 2021-02-06 | Stop reason: HOSPADM

## 2021-02-03 RX ORDER — HYDROMORPHONE HYDROCHLORIDE 1 MG/ML
0.5 INJECTION, SOLUTION INTRAMUSCULAR; INTRAVENOUS; SUBCUTANEOUS
Status: DISCONTINUED | OUTPATIENT
Start: 2021-02-03 | End: 2021-02-03

## 2021-02-03 RX ORDER — SODIUM CHLORIDE 9 MG/ML
INJECTION, SOLUTION INTRAVENOUS CONTINUOUS
Status: DISCONTINUED | OUTPATIENT
Start: 2021-02-03 | End: 2021-02-03

## 2021-02-03 RX ORDER — NALOXONE HYDROCHLORIDE 0.4 MG/ML
0.2 INJECTION, SOLUTION INTRAMUSCULAR; INTRAVENOUS; SUBCUTANEOUS
Status: DISCONTINUED | OUTPATIENT
Start: 2021-02-03 | End: 2021-02-06 | Stop reason: HOSPADM

## 2021-02-03 RX ORDER — BISACODYL 10 MG
10 SUPPOSITORY, RECTAL RECTAL DAILY PRN
Status: DISCONTINUED | OUTPATIENT
Start: 2021-02-03 | End: 2021-02-04

## 2021-02-03 RX ORDER — OXYCODONE HYDROCHLORIDE 5 MG/1
5-10 TABLET ORAL
Status: DISCONTINUED | OUTPATIENT
Start: 2021-02-03 | End: 2021-02-06 | Stop reason: HOSPADM

## 2021-02-03 RX ORDER — FOLIC ACID 1 MG/1
1 TABLET ORAL AT BEDTIME
Status: DISCONTINUED | OUTPATIENT
Start: 2021-02-03 | End: 2021-02-06 | Stop reason: HOSPADM

## 2021-02-03 RX ORDER — AMOXICILLIN 250 MG
1 CAPSULE ORAL 2 TIMES DAILY
Status: DISCONTINUED | OUTPATIENT
Start: 2021-02-03 | End: 2021-02-04

## 2021-02-03 RX ORDER — LIDOCAINE 40 MG/G
CREAM TOPICAL
Status: DISCONTINUED | OUTPATIENT
Start: 2021-02-03 | End: 2021-02-06 | Stop reason: HOSPADM

## 2021-02-03 RX ORDER — PROCHLORPERAZINE MALEATE 10 MG
10 TABLET ORAL EVERY 6 HOURS PRN
Status: DISCONTINUED | OUTPATIENT
Start: 2021-02-03 | End: 2021-02-04

## 2021-02-03 RX ORDER — AMOXICILLIN 250 MG
2 CAPSULE ORAL 2 TIMES DAILY PRN
Status: DISCONTINUED | OUTPATIENT
Start: 2021-02-03 | End: 2021-02-06 | Stop reason: HOSPADM

## 2021-02-03 RX ORDER — ESCITALOPRAM OXALATE 10 MG/1
10 TABLET ORAL DAILY
Status: DISCONTINUED | OUTPATIENT
Start: 2021-02-04 | End: 2021-02-06 | Stop reason: HOSPADM

## 2021-02-03 RX ORDER — LANOLIN ALCOHOL/MO/W.PET/CERES
3 CREAM (GRAM) TOPICAL
Status: DISCONTINUED | OUTPATIENT
Start: 2021-02-03 | End: 2021-02-04

## 2021-02-03 RX ORDER — ONDANSETRON 4 MG/1
4 TABLET, ORALLY DISINTEGRATING ORAL EVERY 6 HOURS PRN
Status: DISCONTINUED | OUTPATIENT
Start: 2021-02-03 | End: 2021-02-06 | Stop reason: HOSPADM

## 2021-02-03 RX ORDER — ACETAMINOPHEN 500 MG
500 TABLET ORAL EVERY 4 HOURS PRN
Status: DISCONTINUED | OUTPATIENT
Start: 2021-02-03 | End: 2021-02-06 | Stop reason: HOSPADM

## 2021-02-03 RX ORDER — PROCHLORPERAZINE 25 MG
25 SUPPOSITORY, RECTAL RECTAL EVERY 12 HOURS PRN
Status: DISCONTINUED | OUTPATIENT
Start: 2021-02-03 | End: 2021-02-04

## 2021-02-03 RX ORDER — ONDANSETRON 2 MG/ML
4 INJECTION INTRAMUSCULAR; INTRAVENOUS EVERY 6 HOURS PRN
Status: DISCONTINUED | OUTPATIENT
Start: 2021-02-03 | End: 2021-02-06 | Stop reason: HOSPADM

## 2021-02-03 RX ORDER — MAGNESIUM OXIDE 400 MG/1
800 TABLET ORAL AT BEDTIME
Status: DISCONTINUED | OUTPATIENT
Start: 2021-02-03 | End: 2021-02-06 | Stop reason: HOSPADM

## 2021-02-03 RX ORDER — HYDROMORPHONE HYDROCHLORIDE 1 MG/ML
.3-.5 INJECTION, SOLUTION INTRAMUSCULAR; INTRAVENOUS; SUBCUTANEOUS
Status: DISCONTINUED | OUTPATIENT
Start: 2021-02-03 | End: 2021-02-06 | Stop reason: HOSPADM

## 2021-02-03 RX ORDER — ACETAMINOPHEN 500 MG
1000 TABLET ORAL 3 TIMES DAILY
Status: DISCONTINUED | OUTPATIENT
Start: 2021-02-04 | End: 2021-02-06 | Stop reason: HOSPADM

## 2021-02-03 RX ORDER — BUPROPION HYDROCHLORIDE 150 MG/1
300 TABLET ORAL EVERY MORNING
Status: DISCONTINUED | OUTPATIENT
Start: 2021-02-04 | End: 2021-02-06 | Stop reason: HOSPADM

## 2021-02-03 RX ORDER — MULTIPLE VITAMINS W/ MINERALS TAB 9MG-400MCG
1 TAB ORAL DAILY
COMMUNITY

## 2021-02-03 RX ORDER — LANOLIN ALCOHOL/MO/W.PET/CERES
100 CREAM (GRAM) TOPICAL AT BEDTIME
Status: DISCONTINUED | OUTPATIENT
Start: 2021-02-03 | End: 2021-02-06 | Stop reason: HOSPADM

## 2021-02-03 RX ORDER — DEXTROSE MONOHYDRATE, SODIUM CHLORIDE, AND POTASSIUM CHLORIDE 50; 1.49; 9 G/1000ML; G/1000ML; G/1000ML
INJECTION, SOLUTION INTRAVENOUS CONTINUOUS
Status: DISCONTINUED | OUTPATIENT
Start: 2021-02-03 | End: 2021-02-04 | Stop reason: ALTCHOICE

## 2021-02-03 RX ORDER — MULTIVITAMIN,THERAPEUTIC
1 TABLET ORAL AT BEDTIME
Status: DISCONTINUED | OUTPATIENT
Start: 2021-02-03 | End: 2021-02-06 | Stop reason: HOSPADM

## 2021-02-03 RX ORDER — AMOXICILLIN 250 MG
2 CAPSULE ORAL 2 TIMES DAILY
Status: DISCONTINUED | OUTPATIENT
Start: 2021-02-03 | End: 2021-02-04

## 2021-02-03 RX ORDER — AMOXICILLIN 250 MG
1 CAPSULE ORAL 2 TIMES DAILY PRN
Status: DISCONTINUED | OUTPATIENT
Start: 2021-02-03 | End: 2021-02-06 | Stop reason: HOSPADM

## 2021-02-03 RX ADMIN — HYDROMORPHONE HYDROCHLORIDE 0.5 MG: 1 INJECTION, SOLUTION INTRAMUSCULAR; INTRAVENOUS; SUBCUTANEOUS at 23:19

## 2021-02-03 RX ADMIN — HYDROMORPHONE HYDROCHLORIDE 0.5 MG: 1 INJECTION, SOLUTION INTRAMUSCULAR; INTRAVENOUS; SUBCUTANEOUS at 21:31

## 2021-02-03 RX ADMIN — HYDROMORPHONE HYDROCHLORIDE 0.5 MG: 1 INJECTION, SOLUTION INTRAMUSCULAR; INTRAVENOUS; SUBCUTANEOUS at 20:47

## 2021-02-03 RX ADMIN — HYDROMORPHONE HYDROCHLORIDE 1 MG: 1 INJECTION, SOLUTION INTRAMUSCULAR; INTRAVENOUS; SUBCUTANEOUS at 20:04

## 2021-02-03 ASSESSMENT — ACTIVITIES OF DAILY LIVING (ADL)
HEARING_DIFFICULTY_OR_DEAF: NO
WALKING_OR_CLIMBING_STAIRS_DIFFICULTY: NO
DIFFICULTY_EATING/SWALLOWING: NO
TOILETING_ISSUES: NO
DIFFICULTY_COMMUNICATING: NO
DRESSING/BATHING_DIFFICULTY: NO
NUMBER_OF_TIMES_PATIENT_HAS_FALLEN_WITHIN_LAST_SIX_MONTHS: 1
VISION_MANAGEMENT: GLASSES
CONCENTRATING,_REMEMBERING_OR_MAKING_DECISIONS_DIFFICULTY: NO
FALL_HISTORY_WITHIN_LAST_SIX_MONTHS: YES
DOING_ERRANDS_INDEPENDENTLY_DIFFICULTY: NO
WEAR_GLASSES_OR_BLIND: YES
PATIENT_/_FAMILY_COMMUNICATION_STYLE: SPOKEN LANGUAGE (ENGLISH OR BILINGUAL)

## 2021-02-03 ASSESSMENT — ENCOUNTER SYMPTOMS
WOUND: 1
NUMBNESS: 0
TREMORS: 1

## 2021-02-03 ASSESSMENT — MIFFLIN-ST. JEOR: SCORE: 1331.99

## 2021-02-04 ENCOUNTER — APPOINTMENT (OUTPATIENT)
Dept: GENERAL RADIOLOGY | Facility: CLINIC | Age: 61
End: 2021-02-04
Attending: INTERNAL MEDICINE
Payer: COMMERCIAL

## 2021-02-04 ENCOUNTER — ANESTHESIA (OUTPATIENT)
Dept: SURGERY | Facility: CLINIC | Age: 61
End: 2021-02-04
Payer: COMMERCIAL

## 2021-02-04 ENCOUNTER — ANESTHESIA EVENT (OUTPATIENT)
Dept: SURGERY | Facility: CLINIC | Age: 61
End: 2021-02-04
Payer: COMMERCIAL

## 2021-02-04 LAB
ANION GAP SERPL CALCULATED.3IONS-SCNC: 3 MMOL/L (ref 3–14)
ANION GAP SERPL CALCULATED.3IONS-SCNC: 5 MMOL/L (ref 3–14)
BUN SERPL-MCNC: 11 MG/DL (ref 7–30)
BUN SERPL-MCNC: 12 MG/DL (ref 7–30)
CALCIUM SERPL-MCNC: 8.8 MG/DL (ref 8.5–10.1)
CALCIUM SERPL-MCNC: 9 MG/DL (ref 8.5–10.1)
CHLORIDE SERPL-SCNC: 97 MMOL/L (ref 94–109)
CHLORIDE SERPL-SCNC: 98 MMOL/L (ref 94–109)
CO2 SERPL-SCNC: 28 MMOL/L (ref 20–32)
CO2 SERPL-SCNC: 28 MMOL/L (ref 20–32)
CREAT SERPL-MCNC: 0.7 MG/DL (ref 0.52–1.04)
CREAT SERPL-MCNC: 0.76 MG/DL (ref 0.52–1.04)
ERYTHROCYTE [DISTWIDTH] IN BLOOD BY AUTOMATED COUNT: 12.7 % (ref 10–15)
GFR SERPL CREATININE-BSD FRML MDRD: 84 ML/MIN/{1.73_M2}
GFR SERPL CREATININE-BSD FRML MDRD: >90 ML/MIN/{1.73_M2}
GLUCOSE SERPL-MCNC: 112 MG/DL (ref 70–99)
GLUCOSE SERPL-MCNC: 133 MG/DL (ref 70–99)
HCT VFR BLD AUTO: 39.9 % (ref 35–47)
HGB BLD-MCNC: 13.1 G/DL (ref 11.7–15.7)
MCH RBC QN AUTO: 30.3 PG (ref 26.5–33)
MCHC RBC AUTO-ENTMCNC: 32.8 G/DL (ref 31.5–36.5)
MCV RBC AUTO: 92 FL (ref 78–100)
PLATELET # BLD AUTO: 250 10E9/L (ref 150–450)
POTASSIUM SERPL-SCNC: 4.1 MMOL/L (ref 3.4–5.3)
POTASSIUM SERPL-SCNC: 4.6 MMOL/L (ref 3.4–5.3)
RBC # BLD AUTO: 4.33 10E12/L (ref 3.8–5.2)
SODIUM SERPL-SCNC: 128 MMOL/L (ref 133–144)
SODIUM SERPL-SCNC: 131 MMOL/L (ref 133–144)
WBC # BLD AUTO: 6.1 10E9/L (ref 4–11)

## 2021-02-04 PROCEDURE — 258N000003 HC RX IP 258 OP 636: Performed by: NURSE ANESTHETIST, CERTIFIED REGISTERED

## 2021-02-04 PROCEDURE — 710N000009 HC RECOVERY PHASE 1, LEVEL 1, PER MIN: Performed by: ORTHOPAEDIC SURGERY

## 2021-02-04 PROCEDURE — 360N000084 HC SURGERY LEVEL 4 W/ FLUORO, PER MIN: Performed by: ORTHOPAEDIC SURGERY

## 2021-02-04 PROCEDURE — 250N000011 HC RX IP 250 OP 636: Performed by: PHYSICIAN ASSISTANT

## 2021-02-04 PROCEDURE — 99232 SBSQ HOSP IP/OBS MODERATE 35: CPT | Performed by: HOSPITALIST

## 2021-02-04 PROCEDURE — 250N000025 HC SEVOFLURANE, PER MIN: Performed by: ORTHOPAEDIC SURGERY

## 2021-02-04 PROCEDURE — 250N000013 HC RX MED GY IP 250 OP 250 PS 637: Performed by: PHYSICIAN ASSISTANT

## 2021-02-04 PROCEDURE — 370N000017 HC ANESTHESIA TECHNICAL FEE, PER MIN: Performed by: ORTHOPAEDIC SURGERY

## 2021-02-04 PROCEDURE — 80048 BASIC METABOLIC PNL TOTAL CA: CPT | Performed by: INTERNAL MEDICINE

## 2021-02-04 PROCEDURE — 999N000179 XR SURGERY CARM FLUORO LESS THAN 5 MIN W STILLS

## 2021-02-04 PROCEDURE — 85027 COMPLETE CBC AUTOMATED: CPT | Performed by: INTERNAL MEDICINE

## 2021-02-04 PROCEDURE — 999N000141 HC STATISTIC PRE-PROCEDURE NURSING ASSESSMENT: Performed by: ORTHOPAEDIC SURGERY

## 2021-02-04 PROCEDURE — 0QS734Z REPOSITION LEFT UPPER FEMUR WITH INTERNAL FIXATION DEVICE, PERCUTANEOUS APPROACH: ICD-10-PCS | Performed by: ORTHOPAEDIC SURGERY

## 2021-02-04 PROCEDURE — 272N000001 HC OR GENERAL SUPPLY STERILE: Performed by: ORTHOPAEDIC SURGERY

## 2021-02-04 PROCEDURE — 36415 COLL VENOUS BLD VENIPUNCTURE: CPT | Performed by: INTERNAL MEDICINE

## 2021-02-04 PROCEDURE — 80048 BASIC METABOLIC PNL TOTAL CA: CPT | Performed by: ANESTHESIOLOGY

## 2021-02-04 PROCEDURE — 258N000003 HC RX IP 258 OP 636: Performed by: ANESTHESIOLOGY

## 2021-02-04 PROCEDURE — 250N000011 HC RX IP 250 OP 636: Performed by: NURSE ANESTHETIST, CERTIFIED REGISTERED

## 2021-02-04 PROCEDURE — C1713 ANCHOR/SCREW BN/BN,TIS/BN: HCPCS | Performed by: ORTHOPAEDIC SURGERY

## 2021-02-04 PROCEDURE — 120N000001 HC R&B MED SURG/OB

## 2021-02-04 PROCEDURE — 250N000011 HC RX IP 250 OP 636: Performed by: INTERNAL MEDICINE

## 2021-02-04 PROCEDURE — 258N000003 HC RX IP 258 OP 636: Performed by: PHYSICIAN ASSISTANT

## 2021-02-04 PROCEDURE — 258N000001 HC RX 258: Performed by: INTERNAL MEDICINE

## 2021-02-04 PROCEDURE — 36415 COLL VENOUS BLD VENIPUNCTURE: CPT | Performed by: ANESTHESIOLOGY

## 2021-02-04 PROCEDURE — 250N000013 HC RX MED GY IP 250 OP 250 PS 637: Performed by: INTERNAL MEDICINE

## 2021-02-04 PROCEDURE — 250N000009 HC RX 250: Performed by: NURSE ANESTHETIST, CERTIFIED REGISTERED

## 2021-02-04 PROCEDURE — 250N000009 HC RX 250: Performed by: ORTHOPAEDIC SURGERY

## 2021-02-04 PROCEDURE — 250N000011 HC RX IP 250 OP 636: Performed by: ANESTHESIOLOGY

## 2021-02-04 DEVICE — IMPLANTABLE DEVICE: Type: IMPLANTABLE DEVICE | Site: HIP | Status: FUNCTIONAL

## 2021-02-04 RX ORDER — LIDOCAINE 40 MG/G
CREAM TOPICAL
Status: DISCONTINUED | OUTPATIENT
Start: 2021-02-04 | End: 2021-02-04

## 2021-02-04 RX ORDER — ONDANSETRON 2 MG/ML
4 INJECTION INTRAMUSCULAR; INTRAVENOUS EVERY 6 HOURS PRN
Status: DISCONTINUED | OUTPATIENT
Start: 2021-02-04 | End: 2021-02-04

## 2021-02-04 RX ORDER — SODIUM CHLORIDE, SODIUM LACTATE, POTASSIUM CHLORIDE, CALCIUM CHLORIDE 600; 310; 30; 20 MG/100ML; MG/100ML; MG/100ML; MG/100ML
INJECTION, SOLUTION INTRAVENOUS CONTINUOUS
Status: DISCONTINUED | OUTPATIENT
Start: 2021-02-04 | End: 2021-02-06 | Stop reason: HOSPADM

## 2021-02-04 RX ORDER — SODIUM CHLORIDE 9 MG/ML
INJECTION, SOLUTION INTRAVENOUS CONTINUOUS PRN
Status: DISCONTINUED | OUTPATIENT
Start: 2021-02-04 | End: 2021-02-04

## 2021-02-04 RX ORDER — LIDOCAINE HYDROCHLORIDE 20 MG/ML
INJECTION, SOLUTION INFILTRATION; PERINEURAL PRN
Status: DISCONTINUED | OUTPATIENT
Start: 2021-02-04 | End: 2021-02-04

## 2021-02-04 RX ORDER — MAGNESIUM HYDROXIDE 1200 MG/15ML
LIQUID ORAL PRN
Status: DISCONTINUED | OUTPATIENT
Start: 2021-02-04 | End: 2021-02-04 | Stop reason: HOSPADM

## 2021-02-04 RX ORDER — SODIUM CHLORIDE 9 MG/ML
INJECTION, SOLUTION INTRAVENOUS CONTINUOUS
Status: DISCONTINUED | OUTPATIENT
Start: 2021-02-04 | End: 2021-02-04 | Stop reason: HOSPADM

## 2021-02-04 RX ORDER — BISACODYL 10 MG
10 SUPPOSITORY, RECTAL RECTAL DAILY PRN
Status: DISCONTINUED | OUTPATIENT
Start: 2021-02-04 | End: 2021-02-06 | Stop reason: HOSPADM

## 2021-02-04 RX ORDER — FENTANYL CITRATE 50 UG/ML
INJECTION, SOLUTION INTRAMUSCULAR; INTRAVENOUS PRN
Status: DISCONTINUED | OUTPATIENT
Start: 2021-02-04 | End: 2021-02-04

## 2021-02-04 RX ORDER — DOCUSATE SODIUM 100 MG/1
100 CAPSULE, LIQUID FILLED ORAL 2 TIMES DAILY
Status: DISCONTINUED | OUTPATIENT
Start: 2021-02-04 | End: 2021-02-06 | Stop reason: HOSPADM

## 2021-02-04 RX ORDER — DEXAMETHASONE SODIUM PHOSPHATE 4 MG/ML
INJECTION, SOLUTION INTRA-ARTICULAR; INTRALESIONAL; INTRAMUSCULAR; INTRAVENOUS; SOFT TISSUE PRN
Status: DISCONTINUED | OUTPATIENT
Start: 2021-02-04 | End: 2021-02-04

## 2021-02-04 RX ORDER — POLYETHYLENE GLYCOL 3350 17 G/17G
17 POWDER, FOR SOLUTION ORAL DAILY
Status: DISCONTINUED | OUTPATIENT
Start: 2021-02-05 | End: 2021-02-06 | Stop reason: HOSPADM

## 2021-02-04 RX ORDER — DIAZEPAM 5 MG
10 TABLET ORAL EVERY 30 MIN PRN
Status: DISCONTINUED | OUTPATIENT
Start: 2021-02-04 | End: 2021-02-06 | Stop reason: HOSPADM

## 2021-02-04 RX ORDER — CEFAZOLIN SODIUM 1 G/3ML
1 INJECTION, POWDER, FOR SOLUTION INTRAMUSCULAR; INTRAVENOUS EVERY 8 HOURS
Status: COMPLETED | OUTPATIENT
Start: 2021-02-04 | End: 2021-02-05

## 2021-02-04 RX ORDER — PROPOFOL 10 MG/ML
INJECTION, EMULSION INTRAVENOUS PRN
Status: DISCONTINUED | OUTPATIENT
Start: 2021-02-04 | End: 2021-02-04

## 2021-02-04 RX ORDER — CEFAZOLIN SODIUM 2 G/100ML
INJECTION, SOLUTION INTRAVENOUS PRN
Status: DISCONTINUED | OUTPATIENT
Start: 2021-02-04 | End: 2021-02-04

## 2021-02-04 RX ORDER — CEFAZOLIN SODIUM 2 G/100ML
2 INJECTION, SOLUTION INTRAVENOUS
Status: DISCONTINUED | OUTPATIENT
Start: 2021-02-04 | End: 2021-02-04

## 2021-02-04 RX ORDER — CEFAZOLIN SODIUM 1 G/3ML
1 INJECTION, POWDER, FOR SOLUTION INTRAMUSCULAR; INTRAVENOUS SEE ADMIN INSTRUCTIONS
Status: DISCONTINUED | OUTPATIENT
Start: 2021-02-04 | End: 2021-02-04

## 2021-02-04 RX ORDER — AMOXICILLIN 250 MG
1 CAPSULE ORAL 2 TIMES DAILY
Status: DISCONTINUED | OUTPATIENT
Start: 2021-02-04 | End: 2021-02-06 | Stop reason: HOSPADM

## 2021-02-04 RX ORDER — DIAZEPAM 10 MG/2ML
5-10 INJECTION, SOLUTION INTRAMUSCULAR; INTRAVENOUS EVERY 30 MIN PRN
Status: DISCONTINUED | OUTPATIENT
Start: 2021-02-04 | End: 2021-02-06 | Stop reason: HOSPADM

## 2021-02-04 RX ORDER — HYDROMORPHONE HYDROCHLORIDE 1 MG/ML
.3-.5 INJECTION, SOLUTION INTRAMUSCULAR; INTRAVENOUS; SUBCUTANEOUS EVERY 5 MIN PRN
Status: DISCONTINUED | OUTPATIENT
Start: 2021-02-04 | End: 2021-02-04 | Stop reason: HOSPADM

## 2021-02-04 RX ORDER — HYDROXYZINE HYDROCHLORIDE 25 MG/1
25 TABLET, FILM COATED ORAL EVERY 6 HOURS PRN
Status: DISCONTINUED | OUTPATIENT
Start: 2021-02-04 | End: 2021-02-06 | Stop reason: HOSPADM

## 2021-02-04 RX ORDER — ONDANSETRON 4 MG/1
4 TABLET, ORALLY DISINTEGRATING ORAL EVERY 6 HOURS PRN
Status: DISCONTINUED | OUTPATIENT
Start: 2021-02-04 | End: 2021-02-04

## 2021-02-04 RX ORDER — PROCHLORPERAZINE MALEATE 10 MG
10 TABLET ORAL EVERY 6 HOURS PRN
Status: DISCONTINUED | OUTPATIENT
Start: 2021-02-04 | End: 2021-02-06 | Stop reason: HOSPADM

## 2021-02-04 RX ORDER — BUPIVACAINE HYDROCHLORIDE 5 MG/ML
INJECTION, SOLUTION PERINEURAL PRN
Status: DISCONTINUED | OUTPATIENT
Start: 2021-02-04 | End: 2021-02-04 | Stop reason: HOSPADM

## 2021-02-04 RX ADMIN — OXYCODONE HYDROCHLORIDE 5 MG: 5 TABLET ORAL at 22:42

## 2021-02-04 RX ADMIN — Medication 800 MG: at 21:35

## 2021-02-04 RX ADMIN — HYDROMORPHONE HYDROCHLORIDE 0.5 MG: 1 INJECTION, SOLUTION INTRAMUSCULAR; INTRAVENOUS; SUBCUTANEOUS at 09:10

## 2021-02-04 RX ADMIN — THIAMINE HCL TAB 100 MG 100 MG: 100 TAB at 21:35

## 2021-02-04 RX ADMIN — HYDROMORPHONE HYDROCHLORIDE 0.5 MG: 1 INJECTION, SOLUTION INTRAMUSCULAR; INTRAVENOUS; SUBCUTANEOUS at 01:42

## 2021-02-04 RX ADMIN — PHENYLEPHRINE HYDROCHLORIDE 100 MCG: 10 INJECTION INTRAVENOUS at 11:51

## 2021-02-04 RX ADMIN — PHENYLEPHRINE HYDROCHLORIDE 100 MCG: 10 INJECTION INTRAVENOUS at 11:33

## 2021-02-04 RX ADMIN — DOCUSATE SODIUM 50 MG AND SENNOSIDES 8.6 MG 1 TABLET: 8.6; 5 TABLET, FILM COATED ORAL at 21:35

## 2021-02-04 RX ADMIN — SODIUM CHLORIDE: 9 INJECTION, SOLUTION INTRAVENOUS at 10:36

## 2021-02-04 RX ADMIN — DOCUSATE SODIUM 100 MG: 100 CAPSULE, LIQUID FILLED ORAL at 21:35

## 2021-02-04 RX ADMIN — SODIUM CHLORIDE, POTASSIUM CHLORIDE, SODIUM LACTATE AND CALCIUM CHLORIDE: 600; 310; 30; 20 INJECTION, SOLUTION INTRAVENOUS at 14:25

## 2021-02-04 RX ADMIN — DEXAMETHASONE SODIUM PHOSPHATE 4 MG: 4 INJECTION, SOLUTION INTRA-ARTICULAR; INTRALESIONAL; INTRAMUSCULAR; INTRAVENOUS; SOFT TISSUE at 11:40

## 2021-02-04 RX ADMIN — HYDROMORPHONE HYDROCHLORIDE 0.5 MG: 1 INJECTION, SOLUTION INTRAMUSCULAR; INTRAVENOUS; SUBCUTANEOUS at 06:39

## 2021-02-04 RX ADMIN — POTASSIUM CHLORIDE, DEXTROSE MONOHYDRATE AND SODIUM CHLORIDE: 150; 5; 900 INJECTION, SOLUTION INTRAVENOUS at 00:04

## 2021-02-04 RX ADMIN — CEFAZOLIN SODIUM 2 G: 2 INJECTION, SOLUTION INTRAVENOUS at 11:30

## 2021-02-04 RX ADMIN — HYDROMORPHONE HYDROCHLORIDE 0.5 MG: 1 INJECTION, SOLUTION INTRAMUSCULAR; INTRAVENOUS; SUBCUTANEOUS at 11:30

## 2021-02-04 RX ADMIN — MIDAZOLAM 2 MG: 1 INJECTION INTRAMUSCULAR; INTRAVENOUS at 11:23

## 2021-02-04 RX ADMIN — PHENYLEPHRINE HYDROCHLORIDE 100 MCG: 10 INJECTION INTRAVENOUS at 11:28

## 2021-02-04 RX ADMIN — FENTANYL CITRATE 25 MCG: 50 INJECTION, SOLUTION INTRAMUSCULAR; INTRAVENOUS at 11:59

## 2021-02-04 RX ADMIN — HYDROMORPHONE HYDROCHLORIDE 0.5 MG: 1 INJECTION, SOLUTION INTRAMUSCULAR; INTRAVENOUS; SUBCUTANEOUS at 16:00

## 2021-02-04 RX ADMIN — ACETAMINOPHEN 1000 MG: 500 TABLET, FILM COATED ORAL at 15:58

## 2021-02-04 RX ADMIN — FENTANYL CITRATE 75 MCG: 50 INJECTION, SOLUTION INTRAMUSCULAR; INTRAVENOUS at 11:23

## 2021-02-04 RX ADMIN — FOLIC ACID 1 MG: 1 TABLET ORAL at 21:35

## 2021-02-04 RX ADMIN — OXYCODONE HYDROCHLORIDE 5 MG: 5 TABLET ORAL at 19:19

## 2021-02-04 RX ADMIN — ASPIRIN 325 MG: 325 TABLET, COATED ORAL at 19:19

## 2021-02-04 RX ADMIN — HYDROMORPHONE HYDROCHLORIDE 0.5 MG: 1 INJECTION, SOLUTION INTRAMUSCULAR; INTRAVENOUS; SUBCUTANEOUS at 03:42

## 2021-02-04 RX ADMIN — SODIUM CHLORIDE: 9 INJECTION, SOLUTION INTRAVENOUS at 11:20

## 2021-02-04 RX ADMIN — CEFAZOLIN 1 G: 1 INJECTION, POWDER, FOR SOLUTION INTRAMUSCULAR; INTRAVENOUS at 19:23

## 2021-02-04 RX ADMIN — PHENYLEPHRINE HYDROCHLORIDE 100 MCG: 10 INJECTION INTRAVENOUS at 11:37

## 2021-02-04 RX ADMIN — LIDOCAINE HYDROCHLORIDE 80 MG: 20 INJECTION, SOLUTION INFILTRATION; PERINEURAL at 11:23

## 2021-02-04 RX ADMIN — PROPOFOL 200 MG: 10 INJECTION, EMULSION INTRAVENOUS at 11:23

## 2021-02-04 RX ADMIN — OXYCODONE HYDROCHLORIDE 5 MG: 5 TABLET ORAL at 00:01

## 2021-02-04 RX ADMIN — ACETAMINOPHEN 1000 MG: 500 TABLET, FILM COATED ORAL at 21:35

## 2021-02-04 RX ADMIN — THERA TABS 1 TABLET: TAB at 21:35

## 2021-02-04 ASSESSMENT — ACTIVITIES OF DAILY LIVING (ADL)
ADLS_ACUITY_SCORE: 15

## 2021-02-04 ASSESSMENT — LIFESTYLE VARIABLES: TOBACCO_USE: 0

## 2021-02-04 NOTE — PLAN OF CARE
Pt arrived from ED at 2300. A&O X4. VSS on RA. CMS intact. Lung sounds clear. Bedrest. NPO since midnight. Managing pain with iv dilaudid. Plan for ortho consult for surgery this a.m.

## 2021-02-04 NOTE — PLAN OF CARE
PT: Noting pt off the unit to OR for surgery. Will hold PT eval until POD1. Will continue to follow.

## 2021-02-04 NOTE — CONSULTS
[facility]  Orthopaedics/Foot and Ankle Surgery Consultation         Pop Good MD    Faustina Morales MRN# [id number]   YOB: 1960 Age: 60 year old      Date of Admission:  2/3/2021  Date of Consult: 02/04/2021           Assessment and Plan:   60-year-old female status post mechanical fall with a left minimally displaced, impacted femoral neck fracture.  Operative intervention recommended to stabilize the fracture site and allow for routine fracture healing, as well as allow for early weightbearing activity on the left leg.  We will proceed to the operating room this morning for closed reduction percutaneous screw fixation of the left minimally displaced, impacted femoral neck fracture.  Hospitalist comanagement is appreciated.  The orthopedic trauma team will continue to follow the patient's care while she remains in house.  Further postoperative recommendations will follow.            Code Status:   Full Code         Primary Care Physician:   No Ref-Primary, Physician None         Requesting Physician:      Dr. Ortiz         Chief Complaint:   Left hip fracture    History is obtained from the patient and medical chart.         History of Present Illness:   Faustina Morales is a 60 year old female who sustained a mechanical fall last evening when she slipped on a patch of ice while getting firewood.  The patient landed onto her left hip and also struck her forehead.  The patient denies any loss of consciousness.  The patient was unable to bear weight on the left hip following the injury and presented to the emergency department where radiographs demonstrated a minimally displaced, impacted left femoral neck fracture.  Incidentally, the patient does recall sustaining a fall a few months ago after which she had intermittent bouts of severe left hip pain, though managed these symptoms with anti-inflammatory medication and never sought further treatment for her left  hip.  The patient has been reasonably comfortable with bedrest precautions.  The patient was admitted to the hospitalist service and orthopedics consultation was requested for definitive treatment of her left hip fracture..           Past Medical History:     Patient Active Problem List   Diagnosis     Allergic rhinitis due to pollen     Recurrent major depressive disorder, in full remission (H)     Melanoma in situ of face excluding eyelid, nose, lip, and ear (H)     Closed fracture of left hip, initial encounter (H)      Past Medical History:   Diagnosis Date     Allergic rhinitis due to pollen      Breast ductal hyperplasia, atypical     Tamoxifen X 5 years     Goiter 2012    multinodular. Saw endocrine. Had FNA     Lichen sclerosus      Spinal stenosis     In neck. Diagnosed by MRI. Did PT and improved.             Past Surgical History:     Past Surgical History:   Procedure Laterality Date     BIOPSY BREAST       EYE SURGERY      retinal detachment     PAROTIDECTOMY Left 2018            Home Medications:     Prior to Admission medications    Medication Sig Last Dose Taking? Auth Provider   budesonide (RINOCORT AQUA) 32 MCG/ACT nasal spray Spray 1 spray into both nostrils daily 2/3/2021 at am Yes Moreno Hagan MD   buPROPion (WELLBUTRIN XL) 300 MG 24 hr tablet Take 1 tablet (300 mg) by mouth every morning 2/3/2021 at am Yes Moreno Hagan MD   Calcium Carbonate-Vitamin D (CALCIUM + D PO) Take 1 tablet by mouth daily  2/3/2021 at am Yes Reported, Patient   escitalopram (LEXAPRO) 10 MG tablet Take 1 tablet (10 mg) by mouth daily 2/3/2021 at am Yes Moreno Hagan MD   multivitamin w/minerals (THERA-VIT-M) tablet Take 1 tablet by mouth daily 2/3/2021 at am Yes Unknown, Entered By History   Omega-3 Fatty Acids (OMEGA-3 FISH OIL PO) Take 2 g by mouth daily  2/3/2021 at am Yes Reported, Patient            Current Medications:           [Auto Hold] acetaminophen  1,000 mg Oral TID     [Auto Hold]  buPROPion  300 mg Oral QAM     [Auto Hold] escitalopram  10 mg Oral Daily     [Auto Hold] fluticasone  2 spray Both Nostrils Daily     [Auto Hold] folic acid  1 mg Oral At Bedtime     [Auto Hold] magnesium oxide  800 mg Oral At Bedtime     [Auto Hold] multivitamin, therapeutic  1 tablet Oral At Bedtime     [Auto Hold] senna-docusate  1 tablet Oral BID    Or     [Auto Hold] senna-docusate  2 tablet Oral BID     [Auto Hold] thiamine  100 mg Oral At Bedtime     [Auto Hold] acetaminophen, [Auto Hold] bisacodyl, diazepam **OR** diazepam, [Auto Hold] HYDROmorphone, [Auto Hold] lidocaine 4%, [Auto Hold] lidocaine (buffered or not buffered), [Auto Hold] melatonin, melatonin, [Auto Hold] naloxone **OR** [Auto Hold] naloxone **OR** [Auto Hold] naloxone **OR** [Auto Hold] naloxone, [Auto Hold] ondansetron **OR** [Auto Hold] ondansetron, [Auto Hold] oxyCODONE, [Auto Hold] polyethylene glycol, [Auto Hold] prochlorperazine **OR** [Auto Hold] prochlorperazine **OR** [Auto Hold] prochlorperazine, [Auto Hold] senna-docusate **OR** [Auto Hold] senna-docusate, [Auto Hold] sodium chloride (PF), [Auto Hold] sodium chloride (PF)         Allergies:     Allergies   Allergen Reactions     Bacitracin-Neomycin-Polymyxin      PN: LW Reaction: Contact Dermatitis            Social History:     Social History     Tobacco Use     Smoking status: Never Smoker     Smokeless tobacco: Never Used   Substance Use Topics     Alcohol use: Yes     Alcohol/week: 21.0 standard drinks     Types: 21 Glasses of wine per week     Comment: 3 glasses of wine/day             Family History:     Family History   Problem Relation Age of Onset     Alzheimer Disease Other      Diabetes Other      Hyperlipidemia Other      Hypertension Father      Heart Disease Father 50              Review of Systems:   The 10 point Review of Systems is negative other than noted in the HPI            Physical Exam:   Blood pressure 120/70, pulse 91, temperature 98.2  F (36.8  C),  "temperature source Oral, resp. rate 16, height 1.651 m (5' 5\"), weight 77.1 kg (170 lb), SpO2 91 %, not currently breastfeeding.  170 lbs 0 oz    Constitutional:   Awake, alert, cooperative, no apparent distress, and appears stated age.     Lungs:   No increased work of breathing, good air exchange.     Musculoskeletal:   The left leg is slightly shortened and rotated on examination.  Logroll of left hip does not reproduce irritability within the groin.  There is no evidence of skin injury overlying the hip fracture site.  There is no tenderness with palpation distal to the mid thigh.  The patient demonstrates active dorsiflexion and plantarflexion function of the ankle.  Sensation is fully intact in all peripheral nerve distributions throughout the left foot.  Toes are warm and well-perfused with brisk capillary refill.  The patient is able to flex and extend the right hip in bed without significant irritability.  There is no reproducible pain with logroll of the right hip.              Data:   All new lab and imaging data was reviewed.  Radiographs of the left hip and pelvis obtained in the emergency department last evening were personally reviewed without limitation and demonstrate a minimally displaced, impacted femoral neck fracture.  There is no evidence of other acute osseous injury.  Minimal degenerative changes are present involving the left hip joint.  Results for orders placed or performed during the hospital encounter of 02/03/21 (from the past 24 hour(s))   CBC with platelets differential   Result Value Ref Range    WBC 6.5 4.0 - 11.0 10e9/L    RBC Count 4.66 3.8 - 5.2 10e12/L    Hemoglobin 14.2 11.7 - 15.7 g/dL    Hematocrit 42.9 35.0 - 47.0 %    MCV 92 78 - 100 fl    MCH 30.5 26.5 - 33.0 pg    MCHC 33.1 31.5 - 36.5 g/dL    RDW 12.6 10.0 - 15.0 %    Platelet Count 326 150 - 450 10e9/L    Diff Method Automated Method     % Neutrophils 38.7 %    % Lymphocytes 48.5 %    % Monocytes 9.0 %    % Eosinophils " 1.8 %    % Basophils 1.5 %    % Immature Granulocytes 0.5 %    Nucleated RBCs 0 0 /100    Absolute Neutrophil 2.5 1.6 - 8.3 10e9/L    Absolute Lymphocytes 3.2 0.8 - 5.3 10e9/L    Absolute Monocytes 0.6 0.0 - 1.3 10e9/L    Absolute Eosinophils 0.1 0.0 - 0.7 10e9/L    Absolute Basophils 0.1 0.0 - 0.2 10e9/L    Abs Immature Granulocytes 0.0 0 - 0.4 10e9/L    Absolute Nucleated RBC 0.0    INR   Result Value Ref Range    INR 0.95 0.86 - 1.14   Partial thromboplastin time   Result Value Ref Range    PTT 24 22 - 37 sec   ABO/Rh type and screen   Result Value Ref Range    ABO B     RH(D) Pos     Antibody Screen Neg     Test Valid Only At [facility]        Specimen Expires 02/06/2021    Basic metabolic panel   Result Value Ref Range    Sodium 130 (L) 133 - 144 mmol/L    Potassium 3.9 3.4 - 5.3 mmol/L    Chloride 96 94 - 109 mmol/L    Carbon Dioxide 22 20 - 32 mmol/L    Anion Gap 12 3 - 14 mmol/L    Glucose 107 (H) 70 - 99 mg/dL    Urea Nitrogen 11 7 - 30 mg/dL    Creatinine 0.64 0.52 - 1.04 mg/dL    GFR Estimate >90 >60 mL/min/[1.73_m2]    GFR Estimate If Black >90 >60 mL/min/[1.73_m2]    Calcium 9.2 8.5 - 10.1 mg/dL   Magnesium   Result Value Ref Range    Magnesium 2.2 1.6 - 2.3 mg/dL   XR Chest 1 View    Narrative    CHEST ONE VIEW   2/3/2021 8:50 PM     HISTORY: Preoperative    COMPARISON: None.      Impression    IMPRESSION: No acute cardiopulmonary disease.    KRISSY STOCK MD   XR Pelvis w Hip Left 1 View    Narrative    EXAM: XR PELVIS AND HIP LEFT 1 VIEW  LOCATION: [facility]  DATE/TIME: 2/3/2021 8:31 PM    INDICATION: Pelvic pain  COMPARISON: None.      Impression    IMPRESSION: Mildly impacted left femoral neck fracture. No dislocation.   Asymptomatic SARS-CoV-2 COVID-19 Virus (Coronavirus) by PCR    Specimen: Nasopharyngeal   Result Value Ref Range    SARS-CoV-2 Virus Specimen Source Nasopharyngeal     SARS-CoV-2 PCR Result NEGATIVE     SARS-CoV-2 PCR Comment (Note)    CBC with  platelets   Result Value Ref Range    WBC 6.1 4.0 - 11.0 10e9/L    RBC Count 4.33 3.8 - 5.2 10e12/L    Hemoglobin 13.1 11.7 - 15.7 g/dL    Hematocrit 39.9 35.0 - 47.0 %    MCV 92 78 - 100 fl    MCH 30.3 26.5 - 33.0 pg    MCHC 32.8 31.5 - 36.5 g/dL    RDW 12.7 10.0 - 15.0 %    Platelet Count 250 150 - 450 10e9/L   Basic metabolic panel   Result Value Ref Range    Sodium 128 (L) 133 - 144 mmol/L    Potassium 4.6 3.4 - 5.3 mmol/L    Chloride 97 94 - 109 mmol/L    Carbon Dioxide 28 20 - 32 mmol/L    Anion Gap 3 3 - 14 mmol/L    Glucose 133 (H) 70 - 99 mg/dL    Urea Nitrogen 12 7 - 30 mg/dL    Creatinine 0.70 0.52 - 1.04 mg/dL    GFR Estimate >90 >60 mL/min/[1.73_m2]    GFR Estimate If Black >90 >60 mL/min/[1.73_m2]    Calcium 9.0 8.5 - 10.1 mg/dL   Basic metabolic panel   Result Value Ref Range    Sodium 131 (L) 133 - 144 mmol/L    Potassium 4.1 3.4 - 5.3 mmol/L    Chloride 98 94 - 109 mmol/L    Carbon Dioxide 28 20 - 32 mmol/L    Anion Gap 5 3 - 14 mmol/L    Glucose 112 (H) 70 - 99 mg/dL    Urea Nitrogen 11 7 - 30 mg/dL    Creatinine 0.76 0.52 - 1.04 mg/dL    GFR Estimate 84 >60 mL/min/[1.73_m2]    GFR Estimate If Black >90 >60 mL/min/[1.73_m2]    Calcium 8.8 8.5 - 10.1 mg/dL

## 2021-02-04 NOTE — PHARMACY-ADMISSION MEDICATION HISTORY
Pharmacy Medication History  Admission medication history interview status for the 2/3/2021  admission is complete. See EPIC admission navigator for prior to admission medications     Location of Interview: Phone  Medication history sources: Spoke w/ patient.  Reviewed patient chart.   Medication history source reliability: Moderate  Adherence assessment: Moderate    In the past week, patient estimated taking medication this percent of the time: greater than 90%    Medication reconciliation completed by provider prior to medication history? No    Time spent in this activity: 10 minutes      Prior to Admission medications    Medication Sig Last Dose Taking? Auth Provider   budesonide (RINOCORT AQUA) 32 MCG/ACT nasal spray Spray 1 spray into both nostrils daily 2/3/2021 at am Yes Moreno Hagan MD   buPROPion (WELLBUTRIN XL) 300 MG 24 hr tablet Take 1 tablet (300 mg) by mouth every morning 2/3/2021 at am Yes Moreno Hagan MD   Calcium Carbonate-Vitamin D (CALCIUM + D PO) Take 1 tablet by mouth daily  2/3/2021 at am Yes Reported, Patient   escitalopram (LEXAPRO) 10 MG tablet Take 1 tablet (10 mg) by mouth daily 2/3/2021 at am Yes Moreno Hagan MD   multivitamin w/minerals (THERA-VIT-M) tablet Take 1 tablet by mouth daily 2/3/2021 at am Yes Unknown, Entered By History   Omega-3 Fatty Acids (OMEGA-3 FISH OIL PO) Take 2 g by mouth daily  2/3/2021 at am Yes Reported, Patient     Melissa Hoffman, PharmD, BCPS

## 2021-02-04 NOTE — ED NOTES
Pt reports her left hip ;pain is now a 5/10 ansd tolerable, pt denies any needs at this time, rn will continue to monitor.

## 2021-02-04 NOTE — ANESTHESIA CARE TRANSFER NOTE
Patient: Faustina Morales    Procedure(s):  CLOSED REDUCTION LEFT FEMORAL NECK FRACTURE WITH PERCUTANEOUS PINNING.    Diagnosis: Femoral neck fracture (H) [S72.009A]  Diagnosis Additional Information: No value filed.    Anesthesia Type:   No value filed.     Note:    Oropharynx: oropharynx clear of all foreign objects  Level of Consciousness: drowsy  Oxygen Supplementation: face mask    Independent Airway: airway patency satisfactory and stable  Dentition: dentition unchanged  Vital Signs Stable: post-procedure vital signs reviewed and stable  Report to RN Given: handoff report given  Patient transferred to: PACU    Handoff Report: Identifed the Patient, Identified the Reponsible Provider, Reviewed the pertinent medical history, Discussed the surgical course, Reviewed Intra-OP anesthesia mangement and issues during anesthesia, Set expectations for post-procedure period and Allowed opportunity for questions and acknowledgement of understanding      Vitals: (Last set prior to Anesthesia Care Transfer)  CRNA VITALS  2/4/2021 1139 - 2/4/2021 1219      2/4/2021             Pulse:  94    Ht Rate:  96    SpO2:  95 %    Resp Rate (observed):  10        Electronically Signed By: RADHA Villagomez CRNA  February 4, 2021  12:19 PM

## 2021-02-04 NOTE — ANESTHESIA PROCEDURE NOTES
Airway   Date/Time: 2/4/2021 11:48 AM   Patient location during procedure: OR  Staff -   Anesthesiologist:  Karen Lantigua  CRNA: Alisa Ashby APRN CRNA  Performed By: CRNA    Consent for Airway   Urgency: elective    Indications and Patient Condition  Indications for airway management: ana-procedural  Induction type:intravenousMask difficulty assessment: 0 - not attempted    Final Airway Details  Final airway type: supraglottic airway    Endotracheal Airway Details   Secured with: pink tape    Post intubation assessment   Placement verified by: capnometry and equal breath sounds   Number of attempts at approach: 1  Number of other approaches attempted: 0  Secured with:pink tape  Ease of procedure: easy  Dentition: Unchanged

## 2021-02-04 NOTE — ANESTHESIA POSTPROCEDURE EVALUATION
Patient: Faustina Morales    Procedure(s):  CLOSED REDUCTION LEFT FEMORAL NECK FRACTURE WITH PERCUTANEOUS PINNING.    Diagnosis:Femoral neck fracture (H) [S72.009A]  Diagnosis Additional Information: No value filed.    Anesthesia Type:  General    Note:  Disposition: Outpatient   Postop Pain Control: Uneventful            Sign Out: Well controlled pain   PONV: No   Neuro/Psych: Uneventful            Sign Out: Acceptable/Baseline neuro status   Airway/Respiratory: Uneventful            Sign Out: Acceptable/Baseline resp. status   CV/Hemodynamics: Uneventful            Sign Out: Acceptable CV status   Other NRE: NONE   DID A NON-ROUTINE EVENT OCCUR?          Last vitals:  Vitals:    02/04/21 1400 02/04/21 1430 02/04/21 1632   BP: (!) 149/87 (!) 145/92 (!) 140/94   Pulse: 83 79 80   Resp: 17 18 16   Temp:   37  C (98.6  F)   SpO2: 95%  92%       Last vitals prior to Anesthesia Care Transfer:  CRNA VITALS  2/4/2021 1139 - 2/4/2021 1239      2/4/2021             Pulse:  94    Ht Rate:  96    SpO2:  95 %    Resp Rate (observed):  10          Electronically Signed By: Karen Lantigua  February 4, 2021  5:17 PM

## 2021-02-04 NOTE — H&P
[facility]    History and Physical - Hospitalist Service       Date of Admission:  2/3/2021    Assessment & Plan   Faustina Morales is a 60 year old female admitted on 2/3/2021. She presents with left leg pain following a fall and is found to have a impacted left femoral neck fracture.    Impacted left femoral neck fracture, closed, initial encounter, traumatic: Patient with a slip and fall on ice.  Patient is on calcium and vitamin D supplementation at baseline, no family history of osteoporosis, though did complete 5 years of tamoxifen for higher risk breast biopsy with atypical cells.  No history of complications associated with anesthesia or postoperative nausea and vomiting.  Partial parotidectomy in 2018, right inferior cheek  Mohs surgery for melanoma in situ in 2017.  Patient is medically optimized for urgent orthopedic intervention at this time.  -Orthopedic surgery consulted for definitive management and trauma evaluation  -N.p.o. at midnight  -Physical therapy to follow following surgical fixation  -Scheduled acetaminophen 1 g 3 times daily as well as 500 mg every 4 hours as needed, oral oxycodone as needed, IV Dilaudid available as needed for breakthrough pain  -Scheduled and as needed bowel regimen in place  -Boost plus between meals per patient request; could substitute for Beneprotein  -Asymptomatic emergent procedure Covid swab pending    Hyponatremia: Serum sodium 130.  Potentially related to alcohol use  -D5 normal saline at 100/h  -Recheck BMP in a.m.    Thyroid nodule, possible: Follows with Dr. Cavazos of endocrinology.  History of fine-needle aspiration and currently clinical monitoring per patient description; fine-needle aspiration in April 2018 consistent with low-grade neoplasm, favored pleomorphic adenoma.  Patient describes specifically a thyroid nodule, though also note partial parotidectomy for pleomorphic adenoma in July 2018 and MRI of neck consistent with  parotid mass prior to surgery, so possible that these diagnoses are actually overlapping, and she actually had a parotid nodule rather than a thyroid nodule.  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  No thyroid nodule on MR neck from May 2018.  -Continue outpatient endocrinology follow-up    High risk alcohol use: No history of withdrawal, though patient regularly has 3 alcoholic beverages/glasses of wine daily  -Thiamine, folate, magnesium, multivitamin daily while hospitalized  -Monitor for signs or symptoms of withdrawal    Depression:  -Continue prior to admission Wellbutrin and Lexapro  -Alcohol cessation would be beneficial in the setting of depression    Allergic rhinitis:  -Continue prior to admission Flonase         Diet: NPO for Medical/Clinical Reasons Except for: Meds    DVT Prophylaxis: Pneumatic Compression Devices  Ordonez Catheter: not present  Code Status:  Full code, confirmed with patient on admission         Disposition Plan   Expected discharge: 2 - 3 days, recommended to TCU vs home pending surgical fixation and progress with therapies once surgical fixation complete, pain controlled, safe disposition plan/ TCU bed available.  Entered: Johny Ortiz MD 02/03/2021, 9:33 PM     The patient's care was discussed with the Patient and Dr Stratton in the emergency department..    Johny Ortiz MD  [facility]  Contact information available via [facility] Paging/Directory      ______________________________________________________________________    Chief Complaint   Left hip/back pain following fall    History is obtained from the patient, discussion with Dr. Stratton in the emergency department, patient's  at bedside, chart review, review of outside records including dermatology and otolaryngology follow-up through [employer] with tolerance of prior anesthesia events  without postoperative nausea or vomiting.    History of Present Illness   Faustina Morales is a 60 year old female who presents to the emergency department after a slip and fall on ice and immediate left lower extremity pain impairing ability to ambulate.  She is found to have an impacted left femoral neck fracture.    Patient reports no other history of fractures, no known history of osteoporosis.  She reports no family history of osteoporosis.  She takes a calcium and vitamin D supplement.  Non-smoker.  Does have a history of 5 years tamoxifen use in the past for atypical cellularity on breast biopsy without diagnosis of breast cancer.  Postmenopausal since approximately age 50.    Patient had no prodrome, she describes her fall is mechanical.  She did hit her head, though no reported loss of consciousness.  Her  was inside preparing dinner, and she was able to crawl up her front porch and ring the doorbell for assistance.    No history of anesthesia reaction or postoperative nausea and vomiting.  Mohs surgery of right cheek for melanoma in situ in 2017, partial parotidectomy in 2018 through [facility] system.    Patient does not smoke cigarettes, though she does use alcohol.  She tells me she drinks approximately 3 glasses of wine per night, has done so for many years.  No history of alcohol withdrawal, though also no recent prolonged period of sobriety.  She has no history of heart disease or stroke.  Has a history of gestational diabetes, though follows with endocrine in the outpatient setting and has her glucose/A1c followed.  No diabetes currently.    Patient's pain initially was 10 out of 10, improved with narcotic administration via EMS as well as in the emergency department.  At rest, pain is minimal, exacerbated with movement.  Occasionally has spasms of pain as well as a more persistent aching pain.    Review of Systems    The 10 point Review of Systems is negative other than noted in the HPI  or here.  No fevers or chills  No shortness of breath  No chest pain or palpitations    Past Medical History    I have reviewed this patient's medical history and updated it with pertinent information if needed.   Past Medical History:   Diagnosis Date     Allergic rhinitis due to pollen      Breast ductal hyperplasia, atypical     Tamoxifen X 5 years     Goiter 2012    multinodular. Saw endocrine. Had FNA; question this diagnosis based on outside records, though I do not have endocrine records currently.  I believe patient actually had a parotid tumor with subsequent resection in July 2018.  See outside records regarding May 2018 MR with parotid mass.     Lichen sclerosus      Spinal stenosis     In neck. Diagnosed by MRI. Did PT and improved.   Melanoma right cheek status post Mohs surgery 2017    Past Surgical History   I have reviewed this patient's surgical history and updated it with pertinent information if needed.  Past Surgical History:   Procedure Laterality Date     BIOPSY BREAST       EYE SURGERY      retinal detachment     PAROTIDECTOMY Left 2018       Social History   I have reviewed this patient's social history and updated it with pertinent information if needed.  Social History     Tobacco Use     Smoking status: Never Smoker     Smokeless tobacco: Never Used   Substance Use Topics     Alcohol use: Yes     Alcohol/week: 21.0 standard drinks     Types: 21 Glasses of wine per week     Comment: 3 glasses of wine/day     Drug use: No       Family History   I have reviewed this patient's family history and updated it with pertinent information if needed.  Family History   Problem Relation Age of Onset     Alzheimer Disease Other      Diabetes Other      Hyperlipidemia Other      Hypertension Father      Heart Disease Father  initial angioplasty/event in his late 50s   Several paternal uncles with heart disease    Prior to Admission Medications   Prior to Admission Medications   Prescriptions Last Dose  Informant Patient Reported? Taking?   Calcium Carbonate-Vitamin D (CALCIUM + D PO)   Yes No   MULTIPLE VITAMIN PO   Yes No   Omega-3 Fatty Acids (OMEGA-3 FISH OIL PO)   Yes No   buPROPion (WELLBUTRIN XL) 300 MG 24 hr tablet   No No   Sig: Take 1 tablet (300 mg) by mouth every morning   budesonide (RINOCORT AQUA) 32 MCG/ACT nasal spray   No No   Sig: Spray 1 spray into both nostrils daily   escitalopram (LEXAPRO) 10 MG tablet   No No   Sig: Take 1 tablet (10 mg) by mouth daily      Facility-Administered Medications: None     Allergies   Allergies   Allergen Reactions     Bacitracin-Neomycin-Polymyxin      PN: LW Reaction: Contact Dermatitis       Physical Exam   Vital Signs: Temp: 97.6  F (36.4  C) Temp src: Temporal BP: 139/80 Pulse: 89   Resp: 18 SpO2: 92 % O2 Device: None (Room air)    Weight: 170 lbs 0 oz    General Appearance: Well-appearing 60-year-old female, overweight, in no distress.  Lying comfortably on [facility].  Does not appear toxic or ill.  Eyes: No scleral icterus or injection  HEENT: Normocephalic.  Central forehead superficial skin lesion from trauma noted  Respiratory: Breath sounds are clear bilaterally to auscultation without wheezes or crackles.  Good effort, good air movement throughout lung fields.  No splinting.  Cardiovascular: Regular rate rhythm.  Heart rate currently in the 80s range.  No murmur  GI: Abdomen soft, nontender palpation.  Nondistended.  No palpable mass.  Lymph/Hematologic: Trace bilateral lymphedema  Genitourinary: Not examined  Skin: Some forehead erythema and early bruising central, just left of midline.  Some petechiae/small ecchymosis associated with left upper eyelid.  Musculoskeletal: No gross deformity of left lower extremity as compared to right, no pain in lateral and posterior aspect of pelvic girdle as well as pain with movement of left lower extremity.  Muscular tone otherwise intact without deformity  Neurologic: Alert, conversant, appropriate  conversation.  Mental status grossly intact.  Patient with sensation equal and intact to light touch in bilateral distal lower extremities across multiple nerve distributions.  Psychiatric: Very pleasant, normal affect    Data   Data reviewed today: I reviewed all medications, new labs and imaging results over the last 24 hours. I personally reviewed the Pelvic x-ray image(s) showing Left femoral neck fracture.    Recent Labs   Lab 02/03/21 2050 02/03/21 2000   WBC  --  6.5   HGB  --  14.2   MCV  --  92   PLT  --  326   INR  --  0.95   *  --    POTASSIUM 3.9  --    CHLORIDE 96  --    CO2 22  --    BUN 11  --    CR 0.64  --    ANIONGAP 12  --    ELOISE 9.2  --    *  --

## 2021-02-04 NOTE — CONSULTS
Brief Ortho Consult Note    Contacted by ED.  XR reviewed.  59 y/o F s/p mechanical fall with left valgus-impacted femoral neck fracture, minimally angulated.  Operative intervention indicated and will plan to proceed with CRPP L femoral neck fracture tomorrow pending medical clearance.  Keep NPO p MN.  Formal consult pending tomorrow.

## 2021-02-04 NOTE — PROGRESS NOTES
A&O, VSS on RA. CMS intact, states some numbness of LLE. IV dilaudid given for pain. Voided x2 on bedpan. Belongings in closet, phone sent down to security. Preop report given, pt left floor at 1020.    Per , ID and insurance card were given to EMS last night. Unable to find them at this time. ED does not have them, not documented as in her possession when she arrived.    Called [place] fire department and gave them floor number to call back if they find the cards as they transported the patient here last night.

## 2021-02-04 NOTE — PROGRESS NOTES
Patient vital signs are at baseline: Yes  Patient able to ambulate as they were prior to admission or with assist devices provided by therapies during their stay:  No, PT tomorrow  Patient MUST void prior to discharge: Due to void.  Patient able to tolerate oral intake:  Partially met, clears  Pain has adequate pain control using Oral analgesics:  No, clears    A&O, VSS on 3L. CMS intact. Dressing CDI. IV infusing . Tolerating water and ice chips. Denies pain.

## 2021-02-04 NOTE — OP NOTE
PREOPERATIVE DIAGNOSIS: Left minimally displaced, impacted femoral neck fracture.    POSTOPERATIVE DIAGNOSIS: Left minimally displaced, impacted femoral neck fracture.    PROCEDURE: Closed reduction, percutaneous screw fixation, left femoral neck fracture.    SURGEON: Pop Good MD.    ASSISTANT: Duncan Russ PA-C.    ANESTHESIA: General with local anesthetic.     ESTIMATED BLOOD LOSS: 20mL.     IMPLANTS: Synthes 6.5 mm partially threaded cannulated screws x3.     COMPLICATIONS: None apparent.    A skilled surgical assistant, Duncan Russ PA-C, was necessary and utilized during the case to assist with patient positioning, prepping and draping, soft tissue retraction and completing the fracture fixation, wound closure, and dressing application.  The assistant was utilized throughout the entire case.    INDICATIONS: Faustina is a pleasant 60-year-old female who sustained a mechanical fall onto the left hip. The patient was unable to bear weight on the leg following the injury and presented to the Emergency Department. Radiographs demonstrated a minimally displaced, impacted femoral neck fracture. To allow for more rapid return to weightbearing activity and limit pulmonary and musculoskeletal deconditioning associated with prolonged bed rest, operative intervention was recommended to stabilize the fracture. Given the limited displacement and angulation of the fracture, closed reduction and percutaneous screw fixation was offered as opposed to a hemiarthroplasty procedure. The risks of avascular necrosis to the femoral head and malunion/nonunion were discussed with the patient and family.  The benefits and risks of surgery were discussed in full with the patient and she provided informed consent to proceed.    DESCRIPTION OF PROCEDURE: The patient was identified in the preoperative holding area on the date of surgery. The operative site was marked with indelible marker and the patient was brought back to the  operating room and transferred to the [place] table in a supine position after successful administration of anesthesia. The right lower extremity was placed into gentle extension. The left lower extremity was placed into the traction boot. Fluoroscopic imaging confirmed appropriate reduction of the left femoral neck fracture on both AP and lateral views. The left hip was prepped and draped in standard sterile fashion. A preoperative timeout was performed, identifying the correct patient, procedure, operative site, antibiotic administration and equipment necessary for the procedure.    Under fluoroscopic guidance, a guidewire was advanced in percutaneous fashion through the lateral thigh and advancing to the lateral cortex of the femur just proximal to the level of the lesser trochanter. A skin incision was made directly over the wire and soft tissue dissection was carefully carried down through the IT band fascia. The wire was then advanced across the fracture site into the femoral head. Two additional wires were advanced under fluoroscopic guidance in an inverted triangle arrangement. AP and lateral images confirmed appropriate positioning of the guidewires without overpenetration into the hip joint. After overdrilling the outer cortex over each wire, Synthes 6.5 mm partially threaded cannulated screws were advanced over each guidewire across the fracture site. Adequate compression was noted across the fracture with appropriate positioning of the screws maintaining adequate tip-apex distance at the femoral head and limiting penetration into the hip joint. The fracture remained stable during screw fixation. Fluoroscopic imaging confirmed appropriate length and positioning of all 3 screws. The fracture remained in a reduced position without angulation or displacement. Around the world views were performed to confirm appropriate positioning of the screws. The wound was copiously irrigated. The IT band fascia and deep  tissue were reapproximated with 2-0 Vicryl suture. Subcutaneous tissues and skin were reapproximated with interrupted 3-0 Monocryl and staples, respectively. Xeroform and a sterile dressing was applied after administration of 0.5% Marcaine within the skin and subcutaneous tissues. The patient was extubated and brought to the PACU in stable condition for further postoperative care.    Postoperatively, the patient will be allowed to bear weight on the left hip with an assistive device. The patient will begin work with therapy tomorrow. The patient will be placed on aspirin for DVT prophylaxis postoperatively. The patient will plan return to clinic for followup in 2 weeks for repeat evaluation including wound check, staple removal and AP pelvis and left hip x-rays.    Of note, all counts were correct at the conclusion of the case.    SAILAJA MILTON MD

## 2021-02-04 NOTE — ED NOTES
Bed: ED13  Expected date:   Expected time:   Means of arrival:   Comments:  Tete 1 60f hip pain after fall eta 5

## 2021-02-04 NOTE — PROGRESS NOTES
[facility]    Medicine Progress Note - Hospitalist Service       Date of Admission:  2/3/2021  Assessment & Plan       Faustina Morales is a 60 year old female admitted on 2/3/2021. She presents with left leg pain following a fall and is found to have a impacted left femoral neck fracture.    Impacted left femoral neck fracture, closed, initial encounter, traumatic: Patient with a slip and fall on ice.  Patient is on calcium and vitamin D supplementation at baseline, no family history of osteoporosis, though did complete 5 years of tamoxifen for higher risk breast biopsy with atypical cells.  No history of complications associated with anesthesia or postoperative nausea and vomiting.  Partial parotidectomy in 2018, right inferior cheek  Mohs surgery for melanoma in situ in 2017.  Patient is medically optimized for urgent orthopedic intervention at this time.  -Closed reduction, perc screw fixation in OR today with Dr Good.  -Patient tolerating diet and pain controlled post op  -Physical therapy to follow following surgical fixation  -Scheduled acetaminophen 1 g 3 times daily as well as 500 mg every 4 hours as needed, oral oxycodone as needed, IV Dilaudid available as needed for breakthrough pain  -Scheduled and as needed bowel regimen in place  -Boost plus between meals per patient request; could substitute for Beneprotein    Hyponatremia: Serum sodium 130.  Potentially related to alcohol use  -LR post op  -Recheck BMP in a.m.    Thyroid nodule, possible: Follows with Dr. Cavazos of endocrinology.  History of fine-needle aspiration and currently clinical monitoring per patient description; fine-needle aspiration in April 2018 consistent with low-grade neoplasm, favored pleomorphic adenoma.  Patient describes specifically a thyroid nodule, though also note partial parotidectomy for pleomorphic adenoma in July 2018 and MRI of neck consistent with parotid mass prior to surgery, so possible  that these diagnoses are actually overlapping, and she actually had a parotid nodule rather than a thyroid nodule.  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  No thyroid nodule on MR neck from May 2018.  -Continue outpatient endocrinology follow-up    High risk alcohol use: No history of withdrawal, though patient regularly has 3 alcoholic beverages/glasses of wine daily  -Thiamine, folate, magnesium, multivitamin daily while hospitalized  -Monitor for signs or symptoms of withdrawal    Depression:  -Continue prior to admission Wellbutrin and Lexapro  -Alcohol cessation would be beneficial in the setting of depression    Allergic rhinitis:  -Continue prior to admission Flonase           Diet: NPO per Anesthesia Guidelines for Procedure/Surgery Except for: Meds  Snacks/Supplements Adult: Boost Plus; Between Meals    DVT Prophylaxis: Pneumatic Compression Devices  Ordonez Catheter: not present  Code Status: Full Code           Disposition Plan   Expected discharge: 2 - 3 days, recommended to transitional care unit once adequate pain management/ tolerating PO medications and safe disposition plan/ TCU bed available.  Entered: Alyson Cabrera MD 02/04/2021, 2:03 PM       The patient's care was discussed with the Bedside Nurse and Patient.    Alyson Cabrera MD  Hospitalist Service  [facility]  Contact information available via [facility] Paging/Directory    ______________________________________________________________________    Interval History   Patient eating post op. Denies N/V. Pain controlled with dilaudid. Plans to try oral pain meds after eating.  at bedside. Pt in good spirits. Satting 95% on 3L. Encouraged IS.    Data reviewed today: I reviewed all medications, new labs and imaging results over the last 24 hours. I personally reviewed no images or EKG's today.    Physical Exam   Vital  Signs: Temp: 98.2  F (36.8  C) Temp src: Oral BP: (!) 141/89 Pulse: 81   Resp: 18 SpO2: 94 % O2 Device: Simple face mask Oxygen Delivery: 4 LPM  Weight: 170 lbs 0 oz  Constitutional: Awake, alert, cooperative, no apparent distress  HEENT: neck supple, no LAD noted, moist mucous membranes  Respiratory: Clear to auscultation bilaterally, no crackles or wheezing  Cardiovascular: Regular rate and rhythm, normal S1 and S2, and no murmur noted  GI: Normal bowel sounds, soft, non-distended, non-tender  Skin/Integumen: No rashes, no cyanosis, no edema  Ext: right leg normal ROM. Left leg in wrapping.  Neuro: CN 2-12 intact, non focal exam      Data   Recent Labs   Lab 02/04/21  1047 02/04/21  0712 02/03/21 2050 02/03/21 2000   WBC  --  6.1  --  6.5   HGB  --  13.1  --  14.2   MCV  --  92  --  92   PLT  --  250  --  326   INR  --   --   --  0.95   * 128* 130*  --    POTASSIUM 4.1 4.6 3.9  --    CHLORIDE 98 97 96  --    CO2 28 28 22  --    BUN 11 12 11  --    CR 0.76 0.70 0.64  --    ANIONGAP 5 3 12  --    ELOISE 8.8 9.0 9.2  --    * 133* 107*  --      Recent Results (from the past 24 hour(s))   XR Chest 1 View    Narrative    CHEST ONE VIEW   2/3/2021 8:50 PM     HISTORY: Preoperative    COMPARISON: None.      Impression    IMPRESSION: No acute cardiopulmonary disease.    KRISSY STOCK MD   XR Pelvis w Hip Left 1 View    Narrative    EXAM: XR PELVIS AND HIP LEFT 1 VIEW  LOCATION: [facility]  DATE/TIME: 2/3/2021 8:31 PM    INDICATION: Pelvic pain  COMPARISON: None.      Impression    IMPRESSION: Mildly impacted left femoral neck fracture. No dislocation.   XR Surgery BHARGAVI L/T 5 Min Fluoro w Stills    Narrative    SURGERY C-ARM FLUOROSCOPY LESS THAN FIVE MINUTES WITH STILLS     2/4/2021 12:05 PM     HISTORY: Closed reduction left femoral neck fracture    COMPARISON: None.      Impression    IMPRESSION: A total of 7 spot fluoroscopic images obtained  intraoperatively. Three intertrochanteric screws noted  through the  proximal femur. Total fluoroscopic time was 0.8 minutes.     Medications     lactated ringers       NO tranexamic acid         acetaminophen  1,000 mg Oral TID     aspirin  325 mg Oral Daily     buPROPion  300 mg Oral QAM     ceFAZolin  1 g Intravenous See Admin Instructions     ceFAZolin  1 g Intravenous Q8H     ceFAZolin  2 g Intravenous Pre-Op/Pre-procedure x 1 dose     docusate sodium  100 mg Oral BID     escitalopram  10 mg Oral Daily     fluticasone  2 spray Both Nostrils Daily     folic acid  1 mg Oral At Bedtime     magnesium oxide  800 mg Oral At Bedtime     multivitamin, therapeutic  1 tablet Oral At Bedtime     [START ON 2/5/2021] polyethylene glycol  17 g Oral Daily     senna-docusate  1 tablet Oral BID     senna-docusate  1 tablet Oral BID    Or     senna-docusate  2 tablet Oral BID     thiamine  100 mg Oral At Bedtime

## 2021-02-04 NOTE — ED NOTES
"[facility]  ED Nurse Handoff Report    ED Chief complaint: Hip Pain (left)      ED Diagnosis: right femoral neck fx  Final diagnoses:   Closed fracture of left hip, initial encounter (H)       Code Status: orders to come from inpt team    Allergies:   Allergies   Allergen Reactions     Bacitracin-Neomycin-Polymyxin      PN: LW Reaction: Contact Dermatitis       Patient Story: pt rep[orts she was walking outside to get wood when she slipped and fell on ice landing on her left hip causing severe pain. Ems called and pt was brought to er.  Focused Assessment:  Pt is alert and oriented x4, speaking full clear sentences, respirations non-labored, skin warm dry and intact, strong pulses throughout, abd soft and non-distended.  Pt has strong pulses and sensation to left leg  But difficulty moving due to pain, leg is not shortened or rotated.    Treatments and/or interventions provided: xray and iv dilaudid for pain  Patient's response to treatments and/or interventions: pain well controled with duialudid    To be done/followed up on inpatient unit:  unknown    Does this patient have any cognitive concerns?:     Activity level - Baseline/Home:  Independent  Activity Level - Current:   Unknown    Patient's Preferred language: English   Needed?: No    Isolation: None  Infection: Not Applicable  Patient tested for COVID 19 prior to admission: YES  Bariatric?: No    Vital Signs:   Vitals:    02/03/21 2003 02/03/21 2015 02/03/21 2022   BP: (!) 147/134 139/80    Pulse: 89     Resp:  18    Temp: 97.6  F (36.4  C)     TempSrc: Temporal     SpO2: 94% 92%    Weight:   77.1 kg (170 lb)   Height:   1.651 m (5' 5\")       Cardiac Rhythm:     Was the PSS-3 completed:   Yes  What interventions are required if any?               Family Comments:   OBS brochure/video discussed/provided to patient/family: No              Name of person given brochure if not patient:               Relationship to patient:     For " the majority of the shift this patient's behavior was Green.   Behavioral interventions performed were.    ED NURSE PHONE NUMBER: *[phone]

## 2021-02-04 NOTE — ED PROVIDER NOTES
"  History   Chief Complaint:  Hip Pain       The history is provided by the patient.      Faustina Morales is a 60 year old female who presents for evaluation of left hip pain secondary to a slip and fall tonight. The patient went outside to [employer] when she slipped and fell. She states that she did hit her head and notes a frontal head wound. She notes of left hip pain since the incident. She is unsure if she had a loss of consciousness. She denies any numbness or tingling. She notes that she has been shaking since the incident.     Review of Systems   Musculoskeletal:        (+) left hip pain   Skin: Positive for wound (frontal head).   Neurological: Positive for tremors. Negative for numbness (or tingling).   All other systems reviewed and are negative.      Allergies:  Bacitracin-Neomycin-Polymyxin    Medications:  Lexapro  Wellbutrin    Past Medical History:    Spinal stenosis  Recurrent major depressive disorder, in full remission  Goiter  Lichen sclerosus  Melanoma   Retinal detachment     Past Surgical History:    Biopsy, breast  Parotidectomy, left   Retinal detachment surgery   Tonsillectomy    Family History:    Hypertension  Heart disease    Social History:  The patient presents to the emergency department alone.      Physical Exam     Patient Vitals for the past 24 hrs:   BP Temp Temp src Pulse Resp SpO2 Height Weight   02/03/21 2022 -- -- -- -- -- -- 1.651 m (5' 5\") 77.1 kg (170 lb)   02/03/21 2015 139/80 -- -- -- 18 92 % -- --   02/03/21 2003 (!) 147/134 97.6  F (36.4  C) Temporal 89 -- 94 % -- --       Physical Exam  General/Appearance: appears stated age, well-groomed, appears to be in significant pain  Eyes: EOMI, no scleral injection, no icterus  ENT: MMM  Neck: supple, nl ROM, no stiffness  Cardiovascular: RRR, nl S1S2, no m/r/g, 2+ pulses in all 4 extremities, cap refill <2sec  Respiratory: CTAB, good air movement throughout, no wheezes/rhonchi/rales, no increased WOB, no " retractions  Back: no lesions  GI: abd soft, non-distended, nttp,  no HSM, no rebound, no guarding, nl BS  MSK: significant pain with palpation of L hip and with any movement of LLE  Skin: warm and well-perfused, no rash, no edema, no ecchymosis, nl turgor  Neuro: GCS 15, alert and oriented, no gross focal neuro deficits  Psych: interacts appropriately  Heme: no petechia, no purpura, no active bleeding        Emergency Department Course     Imaging:  XR Pelvis and Hip left 2 Views:  Mildly impacted left femoral neck fracture. No dislocation.   As per radiology.     XR Chest 1 views:   No acute cardiopulmonary disease.  As per radiology.    Laboratory:  CBC: WBC: 6.5, HB.2, PLT: 326    BMP: Sodium 130 (low), Glucose 107 (high), o/w WNL (Creatinine: 0.64)    INR: 0.95    PTT: 24    ABO/Rh Type and Screen: B Positive, Antibody Screen negative     Asymptomatic SARS-CoV-2 COVID-19 Virus (Coronavirus) PCR: Pending    Emergency Department Course:    Reviewed:  : I reviewed the patient's nursing notes, vitals, past medical records, Care Everywhere.     Assessments:  : I assessed the patient and performed a physical exam at this time.  : I reassessed the patient and informed her of her workup results.    Consults:    I consulted with Dr. Good, orthopedist at [facility], regarding the patient's history and presentation here in the emergency department.   I consulted with Dr. Ortiz, hospitalist, regarding the patient's history and presentation here in the emergency department who accepted the patient for admission.     Interventions:   Dilaudid 1mg IV   Dilaudid 0.5mg IV   Dilaudid 0.5mg IV    Disposition:  The patient was admitted to the hospital under the care of Dr. Ortiz.     Impression & Plan     Covid-19  Faustina Morales was evaluated during a global COVID-19 pandemic, which necessitated consideration that the patient might be at risk for infection with the SARS-CoV-2 virus that causes  COVID-19.   Applicable protocols for evaluation were followed during the patient's care.   COVID-19 was considered as part of the patient's evaluation. The plan for testing is:  a test was obtained during this visit.     Medical Decision Making:  This patient is a 60-year-old female who presents after mechanical fall.  She is got a slight abrasion to her forehead but otherwise did not lose consciousness, has no significant headache, no visual changes, no focal neurologic deficit.  I do not think head CT is indicated.  More pressing she has severe pain to her left hip and an x-ray which shows an impacted left femoral neck fracture.  I spoke with orthopedics who plans to take her to the OR tomorrow.  She will be made n.p.o. at midnight.  Patient is in agreement with this plan.    Diagnosis:    ICD-10-CM    1. Closed fracture of left hip, initial encounter (H)  S72.002A ABO/Rh type and screen       Scribe Disclosure:  I, Jayson Mai, am serving as a scribe at 7:59 PM on 2/3/2021 to document services personally performed by Yasmin Stratton MD based on my observations and the provider's statements to me.          Yasmin Stratton MD  02/03/21 [id number]

## 2021-02-04 NOTE — PROGRESS NOTES
RECEIVING UNIT ED HANDOFF REVIEW    ED Nurse Handoff Report was reviewed by: Elena Beltrán RN on February 3, 2021 at 9:54 PM

## 2021-02-04 NOTE — ED TRIAGE NOTES
Pt BIBA after mechanical fall on ice injuring left hip with severe pain. Pt reports hitting head but denies LOC.

## 2021-02-04 NOTE — ANESTHESIA PREPROCEDURE EVALUATION
Anesthesia Pre-Procedure Evaluation    Patient: Faustina Morales   MRN: [id number] : 1960        Preoperative Diagnosis: Femoral neck fracture (H) [S72.009A]   Procedure : Procedure(s):  CLOSED REDUCTION LEFT FEMORAL NECK FRACTURE WITH PERCUTANEOUS PINNING.     Past Medical History:   Diagnosis Date     Allergic rhinitis due to pollen      Breast ductal hyperplasia, atypical     Tamoxifen X 5 years     Goiter 2012    multinodular. Saw endocrine. Had FNA     Lichen sclerosus      Spinal stenosis     In neck. Diagnosed by MRI. Did PT and improved.      Past Surgical History:   Procedure Laterality Date     BIOPSY BREAST       EYE SURGERY      retinal detachment     PAROTIDECTOMY Left 2018      Allergies   Allergen Reactions     Bacitracin-Neomycin-Polymyxin      PN: LW Reaction: Contact Dermatitis      Social History     Tobacco Use     Smoking status: Never Smoker     Smokeless tobacco: Never Used   Substance Use Topics     Alcohol use: Yes     Alcohol/week: 21.0 standard drinks     Types: 21 Glasses of wine per week     Comment: 3 glasses of wine/day      Wt Readings from Last 1 Encounters:   21 77.1 kg (170 lb)        Anesthesia Evaluation   Pt has had prior anesthetic.     No history of anesthetic complications       ROS/MED HX  ENT/Pulmonary:    (-) tobacco use, asthma and sleep apnea   Neurologic:       Cardiovascular:       METS/Exercise Tolerance:     Hematologic:       Musculoskeletal:       GI/Hepatic:    (-) GERD   Renal/Genitourinary:       Endo:     (+) thyroid problem,     Psychiatric/Substance Use:       Infectious Disease:       Malignancy:       Other:            Physical Exam    Airway        Mallampati: II   TM distance: < 3 FB   Neck ROM: full   Mouth opening: > 3 cm    Respiratory Devices and Support         Dental  no notable dental history         Cardiovascular   cardiovascular exam normal          Pulmonary   pulmonary exam normal                OUTSIDE LABS:  CBC:   Lab  Results   Component Value Date    WBC 6.1 02/04/2021    WBC 6.5 02/03/2021    HGB 13.1 02/04/2021    HGB 14.2 02/03/2021    HCT 39.9 02/04/2021    HCT 42.9 02/03/2021     02/04/2021     02/03/2021     BMP:   Lab Results   Component Value Date     (L) 02/04/2021     (L) 02/04/2021    POTASSIUM 4.1 02/04/2021    POTASSIUM 4.6 02/04/2021    CHLORIDE 98 02/04/2021    CHLORIDE 97 02/04/2021    CO2 28 02/04/2021    CO2 28 02/04/2021    BUN 11 02/04/2021    BUN 12 02/04/2021    CR 0.76 02/04/2021    CR 0.70 02/04/2021     (H) 02/04/2021     (H) 02/04/2021     COAGS:   Lab Results   Component Value Date    PTT 24 02/03/2021    INR 0.95 02/03/2021     POC:   Lab Results   Component Value Date    HCG Negative 12/18/2006     HEPATIC: No results found for: ALBUMIN, PROTTOTAL, ALT, AST, GGT, ALKPHOS, BILITOTAL, BILIDIRECT, MARY  OTHER:   Lab Results   Component Value Date    A1C 5.7 (A) 12/11/2020    ELOISE 8.8 02/04/2021    MAG 2.2 02/03/2021    TSH 1.54 12/11/2020       Anesthesia Plan    ASA Status:  2      Anesthesia Type: General     - Airway: LMA   Induction: Intravenous           Consents    Anesthesia Plan(s) and associated risks, benefits, and realistic alternatives discussed. Questions answered and patient/representative(s) expressed understanding.     - Discussed with:  Patient      - Extended Intubation/Ventilatory Support Discussed: no Extended Intubation.      - Patient is DNR/DNI Status: No    Use of blood products discussed: No .     Postoperative Care    Pain management: IV analgesics, Oral pain medications.   PONV prophylaxis: Ondansetron (or other 5HT-3), Dexamethasone or Solumedrol     Comments:                Karen Lantigua

## 2021-02-05 ENCOUNTER — APPOINTMENT (OUTPATIENT)
Dept: PHYSICAL THERAPY | Facility: CLINIC | Age: 61
End: 2021-02-05
Attending: INTERNAL MEDICINE
Payer: COMMERCIAL

## 2021-02-05 ENCOUNTER — APPOINTMENT (OUTPATIENT)
Dept: PHYSICAL THERAPY | Facility: CLINIC | Age: 61
End: 2021-02-05
Payer: COMMERCIAL

## 2021-02-05 ENCOUNTER — APPOINTMENT (OUTPATIENT)
Dept: OCCUPATIONAL THERAPY | Facility: CLINIC | Age: 61
End: 2021-02-05
Attending: PHYSICIAN ASSISTANT
Payer: COMMERCIAL

## 2021-02-05 LAB
ANION GAP SERPL CALCULATED.3IONS-SCNC: 5 MMOL/L (ref 3–14)
BUN SERPL-MCNC: 8 MG/DL (ref 7–30)
CALCIUM SERPL-MCNC: 9 MG/DL (ref 8.5–10.1)
CHLORIDE SERPL-SCNC: 100 MMOL/L (ref 94–109)
CO2 SERPL-SCNC: 31 MMOL/L (ref 20–32)
CREAT SERPL-MCNC: 0.72 MG/DL (ref 0.52–1.04)
GFR SERPL CREATININE-BSD FRML MDRD: >90 ML/MIN/{1.73_M2}
GLUCOSE SERPL-MCNC: 91 MG/DL (ref 70–99)
HGB BLD-MCNC: 12.4 G/DL (ref 11.7–15.7)
POTASSIUM SERPL-SCNC: 3.8 MMOL/L (ref 3.4–5.3)
SODIUM SERPL-SCNC: 136 MMOL/L (ref 133–144)

## 2021-02-05 PROCEDURE — 250N000013 HC RX MED GY IP 250 OP 250 PS 637: Performed by: PHYSICIAN ASSISTANT

## 2021-02-05 PROCEDURE — 97535 SELF CARE MNGMENT TRAINING: CPT | Mod: GO

## 2021-02-05 PROCEDURE — 97161 PT EVAL LOW COMPLEX 20 MIN: CPT | Mod: GP

## 2021-02-05 PROCEDURE — 97165 OT EVAL LOW COMPLEX 30 MIN: CPT | Mod: GO

## 2021-02-05 PROCEDURE — 36415 COLL VENOUS BLD VENIPUNCTURE: CPT | Performed by: INTERNAL MEDICINE

## 2021-02-05 PROCEDURE — 85018 HEMOGLOBIN: CPT | Performed by: INTERNAL MEDICINE

## 2021-02-05 PROCEDURE — 97116 GAIT TRAINING THERAPY: CPT | Mod: GP

## 2021-02-05 PROCEDURE — 99207 PR NO BILLABLE SERVICE THIS VISIT: CPT | Performed by: HOSPITALIST

## 2021-02-05 PROCEDURE — 80048 BASIC METABOLIC PNL TOTAL CA: CPT | Performed by: INTERNAL MEDICINE

## 2021-02-05 PROCEDURE — 97530 THERAPEUTIC ACTIVITIES: CPT | Mod: GP

## 2021-02-05 PROCEDURE — 36415 COLL VENOUS BLD VENIPUNCTURE: CPT | Performed by: PHYSICIAN ASSISTANT

## 2021-02-05 PROCEDURE — 250N000011 HC RX IP 250 OP 636: Performed by: PHYSICIAN ASSISTANT

## 2021-02-05 PROCEDURE — 99232 SBSQ HOSP IP/OBS MODERATE 35: CPT | Performed by: HOSPITALIST

## 2021-02-05 PROCEDURE — 120N000001 HC R&B MED SURG/OB

## 2021-02-05 PROCEDURE — 82306 VITAMIN D 25 HYDROXY: CPT | Performed by: PHYSICIAN ASSISTANT

## 2021-02-05 PROCEDURE — 250N000013 HC RX MED GY IP 250 OP 250 PS 637: Performed by: INTERNAL MEDICINE

## 2021-02-05 RX ORDER — CHOLECALCIFEROL (VITAMIN D3) 50 MCG
50 TABLET ORAL DAILY
Status: DISCONTINUED | OUTPATIENT
Start: 2021-02-05 | End: 2021-02-06 | Stop reason: HOSPADM

## 2021-02-05 RX ADMIN — ACETAMINOPHEN 1000 MG: 500 TABLET, FILM COATED ORAL at 17:24

## 2021-02-05 RX ADMIN — OXYCODONE HYDROCHLORIDE 10 MG: 5 TABLET ORAL at 18:18

## 2021-02-05 RX ADMIN — THERA TABS 1 TABLET: TAB at 21:25

## 2021-02-05 RX ADMIN — DOCUSATE SODIUM 50 MG AND SENNOSIDES 8.6 MG 1 TABLET: 8.6; 5 TABLET, FILM COATED ORAL at 08:25

## 2021-02-05 RX ADMIN — BUPROPION HYDROCHLORIDE 300 MG: 150 TABLET, FILM COATED, EXTENDED RELEASE ORAL at 08:25

## 2021-02-05 RX ADMIN — DOCUSATE SODIUM 100 MG: 100 CAPSULE, LIQUID FILLED ORAL at 21:24

## 2021-02-05 RX ADMIN — DOCUSATE SODIUM 50 MG AND SENNOSIDES 8.6 MG 1 TABLET: 8.6; 5 TABLET, FILM COATED ORAL at 21:25

## 2021-02-05 RX ADMIN — CEFAZOLIN 1 G: 1 INJECTION, POWDER, FOR SOLUTION INTRAMUSCULAR; INTRAVENOUS at 02:32

## 2021-02-05 RX ADMIN — OXYCODONE HYDROCHLORIDE 10 MG: 5 TABLET ORAL at 02:32

## 2021-02-05 RX ADMIN — HYDROXYZINE HYDROCHLORIDE 25 MG: 25 TABLET, FILM COATED ORAL at 17:24

## 2021-02-05 RX ADMIN — Medication 800 MG: at 21:25

## 2021-02-05 RX ADMIN — OXYCODONE HYDROCHLORIDE 10 MG: 5 TABLET ORAL at 08:28

## 2021-02-05 RX ADMIN — ASPIRIN 325 MG: 325 TABLET, COATED ORAL at 08:26

## 2021-02-05 RX ADMIN — ACETAMINOPHEN 1000 MG: 500 TABLET, FILM COATED ORAL at 21:25

## 2021-02-05 RX ADMIN — POLYETHYLENE GLYCOL 3350 17 G: 17 POWDER, FOR SOLUTION ORAL at 08:26

## 2021-02-05 RX ADMIN — ESCITALOPRAM OXALATE 10 MG: 10 TABLET ORAL at 08:25

## 2021-02-05 RX ADMIN — OXYCODONE HYDROCHLORIDE 10 MG: 5 TABLET ORAL at 11:45

## 2021-02-05 RX ADMIN — Medication 50 MCG: at 08:26

## 2021-02-05 RX ADMIN — FLUTICASONE PROPIONATE 2 SPRAY: 50 SPRAY, METERED NASAL at 08:31

## 2021-02-05 RX ADMIN — HYDROXYZINE HYDROCHLORIDE 25 MG: 25 TABLET, FILM COATED ORAL at 00:03

## 2021-02-05 RX ADMIN — THIAMINE HCL TAB 100 MG 100 MG: 100 TAB at 21:25

## 2021-02-05 RX ADMIN — ACETAMINOPHEN 1000 MG: 500 TABLET, FILM COATED ORAL at 08:25

## 2021-02-05 RX ADMIN — DOCUSATE SODIUM 100 MG: 100 CAPSULE, LIQUID FILLED ORAL at 08:25

## 2021-02-05 RX ADMIN — OXYCODONE HYDROCHLORIDE 10 MG: 5 TABLET ORAL at 21:24

## 2021-02-05 RX ADMIN — HYDROXYZINE HYDROCHLORIDE 25 MG: 25 TABLET, FILM COATED ORAL at 10:56

## 2021-02-05 RX ADMIN — FOLIC ACID 1 MG: 1 TABLET ORAL at 21:29

## 2021-02-05 RX ADMIN — OXYCODONE HYDROCHLORIDE 10 MG: 5 TABLET ORAL at 14:46

## 2021-02-05 ASSESSMENT — ACTIVITIES OF DAILY LIVING (ADL)
ADLS_ACUITY_SCORE: 15

## 2021-02-05 NOTE — PROGRESS NOTES
Patient vital signs are at baseline:Yes  Patient able to ambulate as they were prior to admission or with assist devices provided by therapies during their stay:  Yes, one assist/gb/walker  Patient MUST void prior to discharge:  Yes, bathroom/BSC  Patient able to tolerate oral intake:  Yes  Pain has adequate pain control using Oral analgesics:  Yes, oxycodone and atarax.       Patient is anxious, CIWA scoring 4

## 2021-02-05 NOTE — PLAN OF CARE
Patient is A&O, VSS on 1L NC, CMS intact, regular diet, voiding adequately but uses bedpan overnight, dressing CDI. IV SL, Oxycodone and Atarax given for pain control, and scoring 2 and 6 on CIWA protocol. Will continue to monitor

## 2021-02-05 NOTE — PROGRESS NOTES
02/05/21 0900   Quick Adds   Type of Visit Initial PT Evaluation   Living Environment   People in home spouse;child(linda), adult   Current Living Arrangements house   Home Accessibility stairs to enter home;stairs within home   Number of Stairs, Main Entrance 1   Stair Railings, Main Entrance none   Number of Stairs, Within Home, Primary 10   Stair Railings, Within Home, Primary railing on right side (ascending)   Transportation Anticipated car, drives self;family or friend will provide   Living Environment Comments Patient lives in a 1 story house with a basement, bedroom and bathroom on the main level. She has crutches at home.  is retired and adult son is home during the day   Self-Care   Usual Activity Tolerance good   Current Activity Tolerance fair   Equipment Currently Used at Home none   Activity/Exercise/Self-Care Comment Independent with all mobility and ADLs at baseline   Disability/Function   Hearing Difficulty or Deaf no   Wear Glasses or Blind yes   Vision Management glasses   Difficulty Communicating no   Difficulty Eating/Swallowing no   Walking or Climbing Stairs Difficulty no   Dressing/Bathing Difficulty no   Toileting issues no   Doing Errands Independently Difficulty (such as shopping) no   Fall history within last six months yes   Number of times patient has fallen within last six months 1   General Information   Onset of Illness/Injury or Date of Surgery 02/03/21   Referring Physician Duncan Russ PA-C   Patient/Family Therapy Goals Statement (PT) reduce pain, to go home   Pertinent History of Current Problem (include personal factors and/or comorbidities that impact the POC) Patient is 61 YO F admitted after slipping on ice, L hip fracture. She is POD # L hip ORIF. PMH: L parotidectomy, depression   Existing Precautions/Restrictions fall   Weight-Bearing Status - LUE full weight-bearing   Weight-Bearing Status - RUE full weight-bearing   Weight-Bearing Status - LLE  weight-bearing as tolerated   Weight-Bearing Status - RLE full weight-bearing   Cognition   Orientation Status (Cognition) oriented x 4   Affect/Mental Status (Cognition) WNL   Follows Commands (Cognition) WNL   Pain Assessment   Patient Currently in Pain Yes, see Vital Sign flowsheet  (3/10 L hip with activity)   Integumentary/Edema   Integumentary/Edema Comments Abrasion on forehead from fall; bandaging on L hip in tact   Posture    Posture Not impaired   Range of Motion (ROM)   ROM Quick Adds ROM deficits secondary to surgical procedure;ROM deficits secondary to pain  (L LE)   ROM Comment R LE AROM WNL   Strength   Manual Muscle Testing Quick Adds Deficits observed during functional mobility   Strength Comments Unable to perform L SLR without assist   Bed Mobility   Bed Mobility supine-sit   Supine-Sit [place] (Bed Mobility) moderate assist (50% patient effort);verbal cues   Bed Mobility Limitations decreased ability to use legs for bridging/pushing   Assistive Device (Bed Mobility) bed rails   Comment (Bed Mobility) Supine to sit with increased time with mod assist for progressing L LE off bed and bringing trunk upright   Transfers   Transfers sit-stand transfer   Sit-Stand Transfer   Sit-Stand [place] (Transfers) minimum assist (75% patient effort);verbal cues   Assistive Device (Sit-Stand Transfers) walker, front-wheeled   Sit/Stand Transfer Comments Sit <> stand from edge of bed with height elevated, min assist for balance when transitioning hands to walker.    Gait/Stairs (Locomotion)   [place] Level (Gait) contact guard;verbal cues   Assistive Device (Gait) walker, front-wheeled   Distance in Feet (Required for LE Total Joints) 5' during evaluation + 35' during treatment   Pattern (Gait) step-to   Deviations/Abnormal Patterns (Gait) left sided deviations   Left Sided Gait Deviations heel strike decreased   Comment (Gait/Stairs) Patient ambulated with step-to pattern, heavy upper body support  on walker, and decreased L stance   Balance   Balance no deficits were identified   Balance Comments Good sitting and standing balance, no loss of balance with turning   Sensory Examination   Sensory Perception WNL   Coordination   Coordination no deficits were identified   Clinical Impression   Criteria for Skilled Therapeutic Intervention yes, treatment indicated   PT Diagnosis (PT) gait impairment   Influenced by the following impairments pain, muscle weakness, decreased activity tolerance   Functional limitations due to impairments increased difficulty with bed mobility, transfers and ambulation   Clinical Presentation Stable/Uncomplicated   Clinical Presentation Rationale stable pain levels, stable vital signs   Clinical Decision Making (Complexity) low complexity   Therapy Frequency (PT) 2x/day   Predicted Duration of Therapy Intervention (days/wks) 1 week   Planned Therapy Interventions (PT) balance training;bed mobility training;gait training;home exercise program;patient/family education;ROM (range of motion);stair training;strengthening;transfer training   Anticipated Equipment Needs at Discharge (PT) walker rolling  ( to buy patient a BSC)   Risk & Benefits of therapy have been explained evaluation/treatment results reviewed;care plan/treatment goals reviewed;risks/benefits reviewed;current/potential barriers reviewed;participants voiced agreement with care plan;participants included;patient   Clinical Impression Comments Patient mobilizing slowly but only requiring +1 assist and pain is well-controlled   PT Discharge Planning    PT Discharge Recommendation (DC Rec) home with assist;home with home care physical therapy   PT Rationale for DC Rec Patient has support of  and adult son to provide assist at home. With continued acute skilled PT intervention and incresaed activity during hospitali stay, anticipate patient will progress to safely mobilize at home with Min assist or less. She would  also  benefit from HHPT to progress independence with mobility, strength and endurance until she is able to navigate community distances without assist.    PT Brief overview of current status  Mod assist supine to sit, CGA-Min assist sit to stand, CGA for gait with FWW   Total Evaluation Time   Total Evaluation Time (Minutes) 11

## 2021-02-05 NOTE — PROGRESS NOTES
Orthopedic Surgery  Faustina Morales  2021  Admit Date:  2/3/2021  POD 1 Day Post-Op  S/P Procedure(s):  CLOSED REDUCTION LEFT FEMORAL NECK FRACTURE WITH PERCUTANEOUS PINNING.    Patient resting comfortably in chair.    Pain controlled.  Tolerating oral intake.    Denies nausea or vomiting  Denies chest pain or shortness of breath  No events overnight.     Alert and orient to person, place, and time.  Vital Sign Ranges  Temperature Temp  Av.5  F (36.9  C)  Min: 98  F (36.7  C)  Max: 99  F (37.2  C)   Blood pressure Systolic (24hrs), Av , Min:126 , Max:149        Diastolic (24hrs), Av, Min:71, Max:94      Pulse Pulse  Av.3  Min: 72  Max: 97   Respirations Resp  Av.7  Min: 13  Max: 19   Pulse oximetry SpO2  Av.9 %  Min: 92 %  Max: 98 %       Dressing is clean, dry, and intact.   Minimal erythema of the surrounding skin.   Bilateral calves are soft, non-tender.  Bilateral lower extremity is NVI.  Sensation intact bilateral lower extremities  5/5 motor with resisted dorsi and plantar flexion bilaterally  +Dp pulse    Labs:  Recent Labs   Lab Test 21  0801 21  1047 21  0712   POTASSIUM 3.8 4.1 4.6     Recent Labs   Lab Test 21  0801 21  0712 21   HGB 12.4 13.1 14.2     Recent Labs   Lab Test 21   INR 0.95     Recent Labs   Lab Test 21  0712 21  2000    326       A/P  1. S/p left femoral neck fracture CRPP   Continue ASA for DVT prophylaxis.     Mobilize with PT/OT    WBAT with walker.   Leave dressing intact     Continue current pain regiment.    2. Disposition   Anticipate d/c to home based on progress with PT, pain control and medical clearance.    Elena Lyons PA-C

## 2021-02-05 NOTE — PROGRESS NOTES
[facility]    Medicine Progress Note - Hospitalist Service       Date of Admission:  2/3/2021  Assessment & Plan       Faustina Morales is a 60 year old female admitted on 2/3/2021. She presents with left leg pain following a fall and is found to have a impacted left femoral neck fracture.    Impacted left femoral neck fracture, closed, initial encounter, traumatic: Patient with a slip and fall on ice.  Patient is on calcium and vitamin D supplementation at baseline, no family history of osteoporosis, though did complete 5 years of tamoxifen for higher risk breast biopsy with atypical cells.  No history of complications associated with anesthesia or postoperative nausea and vomiting.  Partial parotidectomy in 2018, right inferior cheek  Mohs surgery for melanoma in situ in 2017.  Patient is medically optimized for urgent orthopedic intervention at this time.  -Closed reduction, perc screw fixation in OR 2/4 with Dr Good.  -Patient tolerating diet and pain controlled post op  -Physical therapy following  -Scheduled acetaminophen 1 g 3 times daily as well as 500 mg every 4 hours as needed, oral oxycodone as needed, IV Dilaudid available as needed for breakthrough pain  -Scheduled and as needed bowel regimen in place  -Boost plus between meals per patient request; could substitute for Beneprotein    Hyponatremia: corrected    Thyroid nodule, possible: Follows with Dr. Cavazos of endocrinology.  History of fine-needle aspiration and currently clinical monitoring per patient description; fine-needle aspiration in April 2018 consistent with low-grade neoplasm, favored pleomorphic adenoma.  Patient describes specifically a thyroid nodule, though also note partial parotidectomy for pleomorphic adenoma in July 2018 and MRI of neck consistent with parotid mass prior to surgery, so possible that these diagnoses are actually overlapping, and she actually had a parotid nodule rather than a thyroid  nodule.  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  No thyroid nodule on MR neck from May 2018.  -Continue outpatient endocrinology follow-up    High risk alcohol use: No history of withdrawal, though patient regularly has 3 alcoholic beverages/glasses of wine daily  -Thiamine, folate, magnesium, multivitamin daily while hospitalized  -Monitor for signs or symptoms of withdrawal - no evidence at this time    Depression:  -Continue prior to admission Wellbutrin and Lexapro  -Alcohol cessation would be beneficial in the setting of depression    Allergic rhinitis:  -Continue prior to admission Flonase       Diet: Snacks/Supplements Adult: Boost Plus; Between Meals  Regular Diet Adult    DVT Prophylaxis: Pneumatic Compression Devices  Ordonez Catheter: not present  Code Status: Full Code           Disposition Plan   Expected discharge: Tomorrow, recommended to prior living arrangement once adequate pain management/ tolerating PO medications and safe disposition plan/ TCU bed available.  Entered: Alyson Cabrera MD 02/05/2021, 12:54 PM       The patient's care was discussed with the Bedside Nurse and Patient.    Alyson Cabrera MD  Hospitalist Service  [facility]  Contact information available via [facility] Paging/Directory    ______________________________________________________________________    Interval History   Patient denies any issues with tolerating po. Denies N/V. Pain controlled with dilaudid. Pain controlled. Pt in good spirits. Satting well on room air. Hoping to get home in the next day or two. Worked with PT this morning and ambulated.    Data reviewed today: I reviewed all medications, new labs and imaging results over the last 24 hours. I personally reviewed no images or EKG's today.    Physical Exam   Vital Signs: Temp: 98.3  F (36.8  C) Temp src: Oral BP: 109/66 Pulse: 81   Resp: 18 SpO2:  96 % O2 Device: None (Room air) Oxygen Delivery: 1 LPM  Weight: 170 lbs 0 oz  Constitutional: Awake, alert, cooperative, no apparent distress  HEENT: neck supple, no LAD noted, moist mucous membranes  Respiratory: Clear to auscultation bilaterally, no crackles or wheezing  Cardiovascular: Regular rate and rhythm, normal S1 and S2, and no murmur noted  GI: Normal bowel sounds, soft, non-distended, non-tender  Skin/Integumen: No rashes, no cyanosis, no edema  Ext: right leg normal ROM. Left leg in wrapping.  Neuro: CN 2-12 intact, non focal exam      Data   Recent Labs   Lab 02/05/21  0801 02/04/21  1047 02/04/21  0712 02/03/21 2000 02/03/21 2000   WBC  --   --  6.1  --  6.5   HGB 12.4  --  13.1  --  14.2   MCV  --   --  92  --  92   PLT  --   --  250  --  326   INR  --   --   --   --  0.95    131* 128*   < >  --    POTASSIUM 3.8 4.1 4.6   < >  --    CHLORIDE 100 98 97   < >  --    CO2 31 28 28   < >  --    BUN 8 11 12   < >  --    CR 0.72 0.76 0.70   < >  --    ANIONGAP 5 5 3   < >  --    ELOISE 9.0 8.8 9.0   < >  --    GLC 91 112* 133*   < >  --     < > = values in this interval not displayed.     No results found for this or any previous visit (from the past 24 hour(s)).  Medications     lactated ringers 100 mL/hr at 02/04/21 1425       acetaminophen  1,000 mg Oral TID     aspirin  325 mg Oral Daily     buPROPion  300 mg Oral QAM     docusate sodium  100 mg Oral BID     escitalopram  10 mg Oral Daily     fluticasone  2 spray Both Nostrils Daily     folic acid  1 mg Oral At Bedtime     magnesium oxide  800 mg Oral At Bedtime     multivitamin, therapeutic  1 tablet Oral At Bedtime     polyethylene glycol  17 g Oral Daily     senna-docusate  1 tablet Oral BID     thiamine  100 mg Oral At Bedtime     cholecalciferol  50 mcg Oral Daily

## 2021-02-05 NOTE — PROGRESS NOTES
02/05/21 1101   Quick Adds   Type of Visit Initial Occupational Therapy Evaluation   Living Environment   People in home spouse;child(linda), adult   Current Living Arrangements house   Home Accessibility stairs to enter home;stairs within home   Living Environment Comments Patient lives in a 1 story house with a basement, bedroom and bathroom on the main level. She has crutches at home.  is retired and adult son is home during the day   Self-Care   Usual Activity Tolerance good   Current Activity Tolerance fair   Equipment Currently Used at Home   (reacher)   Activity/Exercise/Self-Care Comment At baseline is independent in self-cares without a gait aid. Has tub/shower with sliding door with nonslip floor   Disability/Function   Fall history within last six months yes   Number of times patient has fallen within last six months 1   General Information   Onset of Illness/Injury or Date of Surgery 02/03/21   Referring Physician Duncan Russ PA-C   Patient/Family Therapy Goal Statement (OT) Return home   Additional Occupational Profile Info/Pertinent History of Current Problem Patient is 59 YO F admitted after slipping on ice, L hip fracture. She is POD # L hip ORIF. PMH: L parotidectomy, depression   Existing Precautions/Restrictions fall   Cognitive Status Examination   Orientation Status orientation to person, place and time   Affect/Mental Status (Cognitive) WFL   Follows Commands WFL   Visual Perception   Visual Impairment/Limitations corrective lenses full-time   Sensory   Sensory Comments Pt denies BUE/BLE numbness or tingling   Pain Assessment   Patient Currently in Pain Yes, see Vital Sign flowsheet   Strength Comprehensive (MMT)   Comment, General Manual Muscle Testing (MMT) Assessment WFL in BUEs   Bed Mobility   Bed Mobility sit-supine   Transfers   Transfers sit-stand transfer   Sit-Stand Transfer   Sit-Stand [place] (Transfers) supervision   Activities of Daily Living   BADL  Assessment/Intervention lower body dressing;grooming;toileting   Lower Body Dressing Assessment/Training   Wells Level (Lower Body Dressing) maximum assist (25% patient effort)   Grooming Assessment/Training   Wells Level (Grooming) supervision   Toileting   Wells Level (Toileting) minimum assist (75% patient effort)   Instrumental Activities of Daily Living (IADL)   Previous Responsibilities housekeeping;shopping;medication management;driving;work   IADL Comments works from home. Spouse completes most cooking and laundry. Has a small dog   Clinical Impression   Criteria for Skilled Therapeutic Interventions Met (OT) yes   OT Diagnosis decline function   OT Problem List-Impairments impacting ADL activity tolerance impaired;balance;pain;mobility   Assessment of Occupational Performance 5 or more Performance Deficits   Identified Performance Deficits LB dressing, toileting, bathing, grooming, functional mobility   Planned Therapy Interventions (OT) ADL retraining;transfer training;home program guidelines;progressive activity/exercise   Clinical Decision Making Complexity (OT) low complexity   Therapy Frequency (OT) Daily   Predicted Duration of Therapy 2 days   Anticipated Equipment Needs Upon Discharge (OT) dressing equipment;commode chair;shower chair   Risk & Benefits of therapy have been explained evaluation/treatment results reviewed;care plan/treatment goals reviewed;risks/benefits reviewed;participants voiced agreement with care plan;participants included;current/potential barriers reviewed;patient   Comment-Clinical Impression Pt functioning below baseline and will benefit from continued skilled OT to maximize safety and independence   OT Discharge Planning    OT Discharge Recommendation (DC Rec) Home with assist   OT Rationale for DC Rec Anticipate that with further medical management and therapy participation, pt will progress to discharge home with spouse assist. Anticipate pt will  require Rebekah for bathing and SBA for LB dressing,, toileting, grooming, and assist with IADLs. Pt reports her family can provide this assist. Pt has functional activity tolerance and very good safety awareness   Total Evaluation Time (Minutes)   Total Evaluation Time (Minutes) 5

## 2021-02-05 NOTE — PROGRESS NOTES
Care Transitions Team: Following for CC, discharge planning, and disposition.       Per [facility] Mckinley Liaison Handoff:   Writer spoke with patient via phone,introduced myself and explained my role in her care. She plans on discharging home with home care and requested I send referral to [facility].     Living situation:   Support: Lives with spouse and son    Available transportation: Family will provide   Home environment:    Stairs-had rails? 1 step to enter and 1 flight of 10 steps within home.    Equipment available : Tub/shower, no grab bars,    Increase service or equipment needs: 2WW    Prior Function level:   Ambulation Independent   ADL's Independent    Current home care services: None    Family/patient discharge goal: Return home    Barriers to Discharge: Medically stable    Discharge Plan of care: Home with Home Care [facility]      Orders needed for services: Post Op Plan of Care - Home with HC- PT/OT     Weight bearing status and Hip precautions: WBAT with walker     Transportation: Family will provide        Will follow in collaboration with [facility] Huitron -[phone] [facility] for discharge planning.

## 2021-02-05 NOTE — PLAN OF CARE
Patient vital signs are at baseline: Yes  Patient able to ambulate as they were prior to admission or with assist devices provided by therapies during their stay:  No, dangled at bedside tonight. Up w/ PT tomorrow  Patient MUST void prior to discharge: Voiding adequately  Patient able to tolerate oral intake:  Yes  Pain has adequate pain control using Oral analgesics:  Yes     A&Ox4, VSS 1L O2 NC. CMS intact. Dressing CDI. IV infusing . Tolerating water and ice chips. Pain controlled w/ po oxycodone & sched tylenol. Dangled at bedside. Plan pending.

## 2021-02-06 ENCOUNTER — APPOINTMENT (OUTPATIENT)
Dept: OCCUPATIONAL THERAPY | Facility: CLINIC | Age: 61
End: 2021-02-06
Payer: COMMERCIAL

## 2021-02-06 ENCOUNTER — APPOINTMENT (OUTPATIENT)
Dept: PHYSICAL THERAPY | Facility: CLINIC | Age: 61
End: 2021-02-06
Payer: COMMERCIAL

## 2021-02-06 VITALS
WEIGHT: 170 LBS | SYSTOLIC BLOOD PRESSURE: 139 MMHG | DIASTOLIC BLOOD PRESSURE: 88 MMHG | OXYGEN SATURATION: 93 % | TEMPERATURE: 99.2 F | HEIGHT: 65 IN | RESPIRATION RATE: 16 BRPM | BODY MASS INDEX: 28.32 KG/M2 | HEART RATE: 95 BPM

## 2021-02-06 LAB
GLUCOSE BLDC GLUCOMTR-MCNC: 105 MG/DL (ref 70–99)
GLUCOSE SERPL-MCNC: 101 MG/DL (ref 70–99)
HGB BLD-MCNC: 11.8 G/DL (ref 11.7–15.7)

## 2021-02-06 PROCEDURE — 97116 GAIT TRAINING THERAPY: CPT | Mod: GP

## 2021-02-06 PROCEDURE — 85018 HEMOGLOBIN: CPT | Performed by: PHYSICIAN ASSISTANT

## 2021-02-06 PROCEDURE — 999N001017 HC STATISTIC GLUCOSE BY METER IP

## 2021-02-06 PROCEDURE — 250N000013 HC RX MED GY IP 250 OP 250 PS 637: Performed by: PHYSICIAN ASSISTANT

## 2021-02-06 PROCEDURE — 36415 COLL VENOUS BLD VENIPUNCTURE: CPT | Performed by: PHYSICIAN ASSISTANT

## 2021-02-06 PROCEDURE — 82947 ASSAY GLUCOSE BLOOD QUANT: CPT | Performed by: PHYSICIAN ASSISTANT

## 2021-02-06 PROCEDURE — 97535 SELF CARE MNGMENT TRAINING: CPT | Mod: GO

## 2021-02-06 PROCEDURE — 97530 THERAPEUTIC ACTIVITIES: CPT | Mod: GP

## 2021-02-06 PROCEDURE — 250N000013 HC RX MED GY IP 250 OP 250 PS 637: Performed by: INTERNAL MEDICINE

## 2021-02-06 PROCEDURE — 99239 HOSP IP/OBS DSCHRG MGMT >30: CPT | Performed by: HOSPITALIST

## 2021-02-06 RX ORDER — HYDROXYZINE HYDROCHLORIDE 25 MG/1
25 TABLET, FILM COATED ORAL EVERY 6 HOURS PRN
Qty: 30 TABLET | Refills: 0 | Status: SHIPPED | OUTPATIENT
Start: 2021-02-06

## 2021-02-06 RX ORDER — ASPIRIN 325 MG
325 TABLET, DELAYED RELEASE (ENTERIC COATED) ORAL DAILY
Qty: 40 TABLET | Refills: 0 | Status: SHIPPED | OUTPATIENT
Start: 2021-02-07

## 2021-02-06 RX ORDER — DOCUSATE SODIUM 100 MG/1
100 CAPSULE, LIQUID FILLED ORAL 2 TIMES DAILY
Qty: 30 CAPSULE | Refills: 0 | Status: SHIPPED | OUTPATIENT
Start: 2021-02-06

## 2021-02-06 RX ORDER — OXYCODONE HYDROCHLORIDE 5 MG/1
5-10 TABLET ORAL
Qty: 25 TABLET | Refills: 0 | Status: SHIPPED | OUTPATIENT
Start: 2021-02-06 | End: 2021-02-06

## 2021-02-06 RX ORDER — ACETAMINOPHEN 500 MG
1000 TABLET ORAL 3 TIMES DAILY
COMMUNITY
Start: 2021-02-06

## 2021-02-06 RX ORDER — OXYCODONE HYDROCHLORIDE 5 MG/1
5-10 TABLET ORAL
Qty: 40 TABLET | Refills: 0 | Status: SHIPPED | OUTPATIENT
Start: 2021-02-06

## 2021-02-06 RX ADMIN — OXYCODONE HYDROCHLORIDE 10 MG: 5 TABLET ORAL at 10:03

## 2021-02-06 RX ADMIN — HYDROXYZINE HYDROCHLORIDE 25 MG: 25 TABLET, FILM COATED ORAL at 10:03

## 2021-02-06 RX ADMIN — ESCITALOPRAM OXALATE 10 MG: 10 TABLET ORAL at 08:10

## 2021-02-06 RX ADMIN — OXYCODONE HYDROCHLORIDE 10 MG: 5 TABLET ORAL at 07:01

## 2021-02-06 RX ADMIN — BUPROPION HYDROCHLORIDE 300 MG: 150 TABLET, FILM COATED, EXTENDED RELEASE ORAL at 08:10

## 2021-02-06 RX ADMIN — OXYCODONE HYDROCHLORIDE 10 MG: 5 TABLET ORAL at 12:59

## 2021-02-06 RX ADMIN — DOCUSATE SODIUM 50 MG AND SENNOSIDES 8.6 MG 1 TABLET: 8.6; 5 TABLET, FILM COATED ORAL at 08:10

## 2021-02-06 RX ADMIN — DOCUSATE SODIUM 100 MG: 100 CAPSULE, LIQUID FILLED ORAL at 08:10

## 2021-02-06 RX ADMIN — HYDROXYZINE HYDROCHLORIDE 25 MG: 25 TABLET, FILM COATED ORAL at 03:47

## 2021-02-06 RX ADMIN — Medication 50 MCG: at 08:10

## 2021-02-06 RX ADMIN — POLYETHYLENE GLYCOL 3350 17 G: 17 POWDER, FOR SOLUTION ORAL at 08:09

## 2021-02-06 RX ADMIN — FLUTICASONE PROPIONATE 2 SPRAY: 50 SPRAY, METERED NASAL at 08:11

## 2021-02-06 RX ADMIN — ASPIRIN 325 MG: 325 TABLET, COATED ORAL at 08:11

## 2021-02-06 RX ADMIN — ACETAMINOPHEN 1000 MG: 500 TABLET, FILM COATED ORAL at 08:09

## 2021-02-06 RX ADMIN — OXYCODONE HYDROCHLORIDE 10 MG: 5 TABLET ORAL at 03:41

## 2021-02-06 ASSESSMENT — ACTIVITIES OF DAILY LIVING (ADL)
ADLS_ACUITY_SCORE: 17

## 2021-02-06 NOTE — PLAN OF CARE
Neuro- A&O  Most Recent Vitals- Temp: 98.2  F (36.8  C) Temp src: Oral BP: 113/67 Pulse: 82   Resp: 16 SpO2: 90 % O2 Device: None (Room air)   Tele/Cardiac- NA  Resp- RA  Activity- up with 1  Pain- PRN given  Drips- NA  Drains/Tubes- NA  Skin- incision  GI/- WNL  Aggression Color- Green  COVID status- negative  Plan- TBD  Misc- PT A&O pod 2  L hip CMS intact Dressing CDI.up walking uses GB and Walker tolerated regular diet. Voiding able to pass flatus pain control with prn plan to go home pending nursing will continue to monitor    Vivienne Cross RN

## 2021-02-06 NOTE — PROGRESS NOTES
Spoke with pts  about medications pt is going home on.  Discussed each medication.   asked a couple times regarding wine while on oxycodone.  Educated that narcotics and alcohol do not go together as well and ETOH can impair ambulation as well.   verbalized understanding.

## 2021-02-06 NOTE — PLAN OF CARE
Pt is A&Ox4, regular diet, needs assistance getting out of bed but once standing can walk by herself, POD #2 from a left hip fracture. Pt's CIWA scores were 3 and 4. 0200 BG was 105. Pt requested oxy after ambulating to the bathroom, Pt was also feeling very anxious and requested Atarax. Pt had trouble getting comfortable after ambulating to the bathroom.

## 2021-02-06 NOTE — PROGRESS NOTES
Care Coordination:    -Pt discharging to home with pre-arranged home care through [facility] by Kate with [facility].  Added [facility]'s phone number to the discharge instructions. Consult completed.    Gina Graf RN, BAN  Inpatient Care Coordination  [address] [address]  [email or web address]  [email or web address]   Office: [phone]  Fax: [phone]

## 2021-02-06 NOTE — PLAN OF CARE
Patient is A&O x4. Up with one assist/gb/walker to bathroom, voiding. Passing flatus. Denies chest pain or SOB. Pain well managed with tylenol, oxycodone and atarax. IV access discontinued. Dressing changed, CDI. CIWA 3-4 this shift. Reviewed AVS with pt. Provided education on no alcohol while on pain meds, pt verbalized the understanding. No further questions asked. Discharge home with meds and belonging.

## 2021-02-06 NOTE — PROGRESS NOTES
"Faustina Morales  2021  POD # 2  Admit Date:  2/3/2021      Doing well. Resting in bed. Pain managed. Denies chest pain, SOB, or calf pain.    Objective:  Blood pressure 139/88, pulse 95, temperature 99.2  F (37.3  C), temperature source Oral, resp. rate 16, height 1.651 m (5' 5\"), weight 77.1 kg (170 lb), SpO2 93 %, not currently breastfeeding.    Temperatures:  Current - Temp: 99.2  F (37.3  C); Max - Temp  Av.4  F (36.9  C)  Min: 98  F (36.7  C)  Max: 99.2  F (37.3  C)  Pulse range: Pulse  Av.5  Min: 81  Max: 95  Blood pressure range: Systolic (24hrs), Av , Min:101 , Max:139   ; Diastolic (24hrs), Av, Min:66, Max:88    Exam:  Dressing is clean, dry, and intact.   Minimal erythema of the surrounding skin.   Bilateral calves are soft, non-tender.  Bilateral lower extremity is NVI.  Sensation intact bilateral lower extremities  5/5 motor with resisted dorsi and plantar flexion bilaterally  +Dp pulse    Labs:  Recent Labs   Lab Test 21  0801 21  1047 21  0712   POTASSIUM 3.8 4.1 4.6     Recent Labs   Lab Test 21  0751 21  0801 21  0712   HGB 11.8 12.4 13.1     Recent Labs   Lab Test 21   INR 0.95     Recent Labs   Lab Test 21  0712 21  2000    326       PLAN:   S/p left femoral neck fracture CRPP              Continue ASA for DVT prophylaxis.                Mobilize with PT/OT               WBAT with walker.              Leave dressing intact                Continue current pain regiment.     2. Disposition              Anticipate d/c to home today based on progress with PT, pain control and medical clearance.    Gricel Paz PA-C    "

## 2021-02-06 NOTE — DISCHARGE SUMMARY
[facility]  Hospitalist Discharge Summary      Date of Admission:  2/3/2021  Date of Discharge:  2/6/2021  Discharging Provider: Alyson Cabrera MD      Discharge Diagnoses    Mechanical fall  Impacted left femoral neck fracture, closed, initial encounter, traumatic  S/p closed reduction and IMS fixation  Hyponatremia, resolved      Follow-ups Needed After Discharge   Follow-up Appointments     Follow-up and recommended labs and tests       Follow up with Dr. Good , at [facility], within 10-14   days           Unresulted Labs Ordered in the Past 30 Days of this Admission     Date and Time Order Name Status Description    2/5/2021 0728 25 Hydroxyvitamin D2 and D3 In process       These results will be followed up by orthopedics    Discharge Disposition   Discharged to home with home RN/PT/OT/HHA  Condition at discharge: Stable      Hospital Course         Faustina Morales is a 60 year old female admitted on 2/3/2021. She presents with left leg pain following a fall and is found to have a impacted left femoral neck fracture.    Impacted left femoral neck fracture, closed, initial encounter, traumatic: Patient with a slip and fall on ice.  Patient is on calcium and vitamin D supplementation at baseline, no family history of osteoporosis, though did complete 5 years of tamoxifen for higher risk breast biopsy with atypical cells.  No history of complications associated with anesthesia or postoperative nausea and vomiting.  Partial parotidectomy in 2018, right inferior cheek  Mohs surgery for melanoma in situ in 2017.  Patient is medically optimized for urgent orthopedic intervention at this time.  -Closed reduction, perc screw fixation in OR 2/4 with Dr Good.  -Patient tolerating diet and pain controlled post op  -Physical therapy following  -Scheduled acetaminophen 1 g 3 times daily as well as 500 mg every 4 hours as needed, oral oxycodone as needed, IV Dilaudid  available as needed for breakthrough pain  -Scheduled and as needed bowel regimen in place  -Boost plus between meals per patient request; could substitute for Beneprotein    Hyponatremia: corrected    Thyroid nodule, possible: Follows with Dr. Cavazos of endocrinology.  History of fine-needle aspiration and currently clinical monitoring per patient description; fine-needle aspiration in April 2018 consistent with low-grade neoplasm, favored pleomorphic adenoma.  Patient describes specifically a thyroid nodule, though also note partial parotidectomy for pleomorphic adenoma in July 2018 and MRI of neck consistent with parotid mass prior to surgery, so possible that these diagnoses are actually overlapping, and she actually had a parotid nodule rather than a thyroid nodule.  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  No thyroid nodule on MR neck from May 2018.  -Continue outpatient endocrinology follow-up    High risk alcohol use: No history of withdrawal, though patient regularly has 3 alcoholic beverages/glasses of wine daily  -Thiamine, folate, magnesium, multivitamin daily while hospitalized  -Monitor for signs or symptoms of withdrawal - no evidence at this time    Depression:  -Continue prior to admission Wellbutrin and Lexapro  -Alcohol cessation would be beneficial in the setting of depression    Allergic rhinitis:  -Continue prior to admission Flonase      Consultations This Hospital Stay   ORTHOPEDIC SURGERY IP CONSULT  PHYSICAL THERAPY ADULT IP CONSULT  SOCIAL WORK IP CONSULT  PHYSICAL THERAPY ADULT IP CONSULT  OCCUPATIONAL THERAPY ADULT IP CONSULT  CARE MANAGEMENT / SOCIAL WORK IP CONSULT    Code Status   Full Code    Time Spent on this Encounter   IAlyson MD, personally saw the patient today and spent greater than 30 minutes discharging this patient.       Alyson Cabrera MD  [facility]  [address]  [address] [place] [address]  Phone: [phone]  ______________________________________________________________________    Physical Exam   Vital Signs: Temp: 99.2  F (37.3  C) Temp src: Oral BP: 139/88 Pulse: 95   Resp: 16 SpO2: 93 % O2 Device: None (Room air)    Weight: 170 lbs 0 oz    Primary Care Physician   Physician No Ref-Primary    Discharge Orders      Reason for your hospital stay    Left hip fracture     Follow-up and recommended labs and tests     Follow up with Dr. Good , at [facility], within 10-14 days     Wound care and dressings    Instructions to care for your wound at home: as directed.     When to contact your care team    Call [facility] if you have any of the following: temperature greater than 100 or less than 96,  increased shortness of breath, increased drainage, increased swelling or increased pain.     Discharge Instructions     Activity    Your activity upon discharge: activity as tolerated - or per instructions from orthopedics     Crutches DME    DME Documentation: Describe the reason for need to support medical necessity: Impaired gait status post hip surgery. I, the undersigned, certify that the above prescribed supplies are medically necessary for this patient and is both reasonable and necessary in reference to accepted standards of medical practice in the treatment of this patient's condition and is not prescribed as a convenience.     Cane DME    DME Documentation: Describe the reason for need to support medical necessity: Impaired gait status post hip surgery. I, the undersigned, certify that the above prescribed supplies are medically necessary for this patient and is both reasonable and necessary in reference to accepted standards of medical practice in the treatment of this patient's condition and is not prescribed as a convenience.     Walker DME    : DME Documentation: Describe the reason for need to support medical necessity:  Impaired gait status post hip surgery. I, the undersigned, certify that the above prescribed supplies are medically necessary for this patient and is both reasonable and necessary in reference to accepted standards of medical practice in the treatment of this patient's condition and is not prescribed as a convenience.     Diet    Follow this diet upon discharge: Orders Placed This Encounter      Snacks/Supplements Adult: Boost Plus; Between Meals      Regular Diet Adult       Significant Results and Procedures   Most Recent 3 CBC's:  Recent Labs   Lab Test 02/06/21 0751 02/05/21  0801 02/04/21  0712 02/03/21 2000   WBC  --   --  6.1 6.5   HGB 11.8 12.4 13.1 14.2   MCV  --   --  92 92   PLT  --   --  250 326     Most Recent 3 BMP's:  Recent Labs   Lab Test 02/06/21 0751 02/05/21  0801 02/04/21  1047 02/04/21  0712   NA  --  136 131* 128*   POTASSIUM  --  3.8 4.1 4.6   CHLORIDE  --  100 98 97   CO2  --  31 28 28   BUN  --  8 11 12   CR  --  0.72 0.76 0.70   ANIONGAP  --  5 5 3   ELOISE  --  9.0 8.8 9.0   * 91 112* 133*   ,   Results for orders placed or performed during the hospital encounter of 02/03/21   XR Pelvis w Hip Left 1 View    Narrative    EXAM: XR PELVIS AND HIP LEFT 1 VIEW  LOCATION: [facility]  DATE/TIME: 2/3/2021 8:31 PM    INDICATION: Pelvic pain  COMPARISON: None.      Impression    IMPRESSION: Mildly impacted left femoral neck fracture. No dislocation.   XR Chest 1 View    Narrative    CHEST ONE VIEW   2/3/2021 8:50 PM     HISTORY: Preoperative    COMPARISON: None.      Impression    IMPRESSION: No acute cardiopulmonary disease.    KRISSY STOCK MD   XR Surgery BHARGAVI L/T 5 Min Fluoro w Stills    Narrative    SURGERY C-ARM FLUOROSCOPY LESS THAN FIVE MINUTES WITH STILLS     2/4/2021 12:05 PM     HISTORY: Closed reduction left femoral neck fracture    COMPARISON: None.      Impression    IMPRESSION: A total of 7 spot fluoroscopic images obtained  intraoperatively. Three  intertrochanteric screws noted through the  proximal femur. Total fluoroscopic time was 0.8 minutes.    ANDREA REAL MD       Discharge Medications   Current Discharge Medication List      START taking these medications    Details   acetaminophen (TYLENOL) 500 MG tablet Take 2 tablets (1,000 mg) by mouth 3 times daily  Qty:      Associated Diagnoses: Closed fracture of left hip, initial encounter (H)      aspirin (ASA) 325 MG EC tablet Take 1 tablet (325 mg) by mouth daily  Qty: 40 tablet, Refills: 0    Associated Diagnoses: Closed fracture of left hip, initial encounter (H)      docusate sodium (COLACE) 100 MG capsule Take 1 capsule (100 mg) by mouth 2 times daily  Qty: 30 capsule, Refills: 0    Associated Diagnoses: Closed fracture of left hip, initial encounter (H)      hydrOXYzine (ATARAX) 25 MG tablet Take 1 tablet (25 mg) by mouth every 6 hours as needed for other (adjuvant pain)  Qty: 30 tablet, Refills: 0    Associated Diagnoses: Closed fracture of left hip, initial encounter (H)      oxyCODONE (ROXICODONE) 5 MG tablet Take 1-2 tablets (5-10 mg) by mouth every 3 hours as needed for severe pain  Qty: 40 tablet, Refills: 0    Associated Diagnoses: Closed fracture of left hip, initial encounter (H)         CONTINUE these medications which have NOT CHANGED    Details   budesonide (RINOCORT AQUA) 32 MCG/ACT nasal spray Spray 1 spray into both nostrils daily  Qty: 1 Bottle, Refills: 3    Associated Diagnoses: Allergic rhinitis due to pollen      buPROPion (WELLBUTRIN XL) 300 MG 24 hr tablet Take 1 tablet (300 mg) by mouth every morning  Qty: 90 tablet, Refills: 3    Associated Diagnoses: Recurrent major depressive disorder, in full remission (H)      Calcium Carbonate-Vitamin D (CALCIUM + D PO) Take 1 tablet by mouth daily       escitalopram (LEXAPRO) 10 MG tablet Take 1 tablet (10 mg) by mouth daily  Qty: 90 tablet, Refills: 3    Associated Diagnoses: Recurrent major depressive disorder, in full remission (H)       multivitamin w/minerals (THERA-VIT-M) tablet Take 1 tablet by mouth daily      Omega-3 Fatty Acids (OMEGA-3 FISH OIL PO) Take 2 g by mouth daily            Allergies   Allergies   Allergen Reactions     Bacitracin-Neomycin-Polymyxin      PN: LW Reaction: Contact Dermatitis

## 2021-02-06 NOTE — PROGRESS NOTES
Patient vital signs are at baseline: Yes  Patient able to ambulate as they were prior to admission or with assist devices provided by therapies during their stay:  Yes, one assist/gb/walker  Patient MUST void prior to discharge:  Yes, Bathroom  Patient able to tolerate oral intake:  Yes  Pain has adequate pain control using Oral analgesics:  Yes, oxycodone, atarax    Pt up in chair for meals. Ambulated in room with one assist/gb/walker. Pain well controlled with oxycodone and atarax around the clock. Dressing CDI. Will continue to monitor.

## 2021-02-06 NOTE — DISCHARGE INSTRUCTIONS
-The patient will discharge home with [facility].  [facility] will contact the patient directly to arrange the first visit.  [facility]'s phone number is [phone].    ________________________________________      Recommendation:               Continue ASA for DVT prophylaxis.                Mobilize with PT/OT               WBAT with walker.              Leave dressing intact                Continue current pain regiment.     contact [facility] with any questions or concern: [phone]

## 2021-02-07 NOTE — PLAN OF CARE
Occupational Therapy Discharge Summary    Reason for therapy discharge:    Discharged to home.    Progress towards therapy goal(s). See goals on Care Plan in [facility] electronic health record for goal details.  Goals met    Therapy recommendation(s):    No further therapy is recommended.

## 2021-02-09 LAB
DEPRECATED CALCIDIOL+CALCIFEROL SERPL-MC: <45 UG/L (ref 20–75)
VITAMIN D2 SERPL-MCNC: <5 UG/L
VITAMIN D3 SERPL-MCNC: 40 UG/L

## 2021-02-24 ENCOUNTER — TRANSFERRED RECORDS (OUTPATIENT)
Dept: HEALTH INFORMATION MANAGEMENT | Facility: CLINIC | Age: 61
End: 2021-02-24

## 2021-03-16 NOTE — PROGRESS NOTES
Faustina is a 60 year old  female who presents for annual exam.     Besides routine health maintenance,  she would like to discuss Dexa scan due to hip fracture 6 weeks  .    HPI:  The patient's PCP is  [facility].    Hip fracture   Was on tamoxifen for 5 yrs   no prior dexa scan  fam history - negative  Traumatic fall caused hip fracture  Has an endocrine but no pcp  Lives with  in [place], she is still working      GYNECOLOGIC HISTORY:    No LMP recorded. Patient is postmenopausal.    Her current contraception method is: menopause.  She  reports that she has never smoked. She has never used smokeless tobacco.    Patient is not sexually active.  STD testing offered?  Declined  Last PHQ-9 score on record =   PHQ-9 SCORE 2020   PHQ-9 Total Score 0     Last GAD7 score on record =   LOW-7 SCORE 2020   Total Score 0         HEALTH MAINTENANCE:    Cholesterol:   Recent Labs   Lab Test 20   CHOL 265* 260*    102   * 143*   TRIG 84 49      Last Mammo: One year ago, Result: Normal, Next Mammo: Today   Pap:   Lab Results   Component Value Date    PAP NIL 2017     Colonoscopy:  Colonoscopy:  17, Result: Normal, but poor prep, Next Colonoscopy: 5 years.  Dexa:  never    Health maintenance updated:  yes    HISTORY:  OB History    Para Term  AB Living   4 2 2 0 2 2   SAB TAB Ectopic Multiple Live Births   2 0 0 0 0      # Outcome Date GA Lbr Nadir/2nd Weight Sex Delivery Anes PTL Lv   4 Term            3 Term            2 SAB            1 SAB                Patient Active Problem List   Diagnosis     Allergic rhinitis due to pollen     Recurrent major depressive disorder, in full remission (H)     Melanoma in situ of face excluding eyelid, nose, lip, and ear (H)     Closed fracture of left hip, initial encounter (H)     Past Surgical History:   Procedure Laterality Date     BIOPSY BREAST       CLOSED REDUCTION, PERCUTANEOUS PINNING HIP  Left 02/04/2021    Procedure: CLOSED REDUCTION LEFT FEMORAL NECK FRACTURE WITH PERCUTANEOUS PINNING.;  Surgeon: Pop Good MD;  Location: [facility] OR     EYE SURGERY      retinal detachment     HIP SURGERY  02/04/2021    left hip surgery     PAROTIDECTOMY Left 2018      Social History     Tobacco Use     Smoking status: Never Smoker     Smokeless tobacco: Never Used   Substance Use Topics     Alcohol use: Yes     Alcohol/week: 21.0 standard drinks     Types: 21 Glasses of wine per week     Comment: 3 glasses of wine/day      Problem (# of Occurrences) Relation (Name,Age of Onset)    Alzheimer Disease (1) Other    Diabetes (1) Other    Heart Disease (1) Father (50)    Hyperlipidemia (1) Other    Hypertension (1) Father            Current Outpatient Medications   Medication Sig     budesonide (RINOCORT AQUA) 32 MCG/ACT nasal spray Spray 1 spray into both nostrils daily     buPROPion (WELLBUTRIN XL) 300 MG 24 hr tablet Take 1 tablet (300 mg) by mouth every morning     Calcium Carbonate-Vitamin D (CALCIUM + D PO) Take 1 tablet by mouth daily      escitalopram (LEXAPRO) 10 MG tablet Take 1 tablet (10 mg) by mouth daily     multivitamin w/minerals (THERA-VIT-M) tablet Take 1 tablet by mouth daily     Omega-3 Fatty Acids (OMEGA-3 FISH OIL PO) Take 2 g by mouth daily      acetaminophen (TYLENOL) 500 MG tablet Take 2 tablets (1,000 mg) by mouth 3 times daily (Patient not taking: Reported on 3/17/2021)     aspirin (ASA) 325 MG EC tablet Take 1 tablet (325 mg) by mouth daily (Patient not taking: Reported on 3/17/2021)     docusate sodium (COLACE) 100 MG capsule Take 1 capsule (100 mg) by mouth 2 times daily (Patient not taking: Reported on 3/17/2021)     hydrOXYzine (ATARAX) 25 MG tablet Take 1 tablet (25 mg) by mouth every 6 hours as needed for other (adjuvant pain) (Patient not taking: Reported on 3/17/2021)     oxyCODONE (ROXICODONE) 5 MG tablet Take 1-2 tablets (5-10 mg) by mouth every 3 hours as needed for severe  "pain (Patient not taking: Reported on 3/17/2021)     No current facility-administered medications for this visit.      Allergies   Allergen Reactions     Bacitracin-Neomycin-Polymyxin      PN: LW Reaction: Contact Dermatitis       Past medical, surgical, social and family histories were reviewed and updated in EPIC.    ROS:   12 point review of systems negative other than symptoms noted below or in the HPI.  No urinary frequency or dysuria, bladder or kidney problems    EXAM:  /72   Ht 1.626 m (5' 4\")   Wt 78.5 kg (173 lb)   BMI 29.70 kg/m     BMI: Body mass index is 29.7 kg/m .    PHYSICAL EXAM:  Constitutional:   Appearance: Well nourished, well developed, alert, in no acute distress  Neck:  Lymph Nodes:  No lymphadenopathy present    Thyroid:  Gland size normal, nontender, no nodules or masses present  on palpation  Chest:  Respiratory Effort:  Breathing unlabored  Cardiovascular:    Heart: Auscultation:  Regular rate, normal rhythm, no murmurs present  Breasts: Inspection of Breasts:  No lymphadenopathy present., Palpation of Breasts and Axillae:  No masses present on palpation, no breast tenderness., Axillary Lymph Nodes:  No lymphadenopathy present. and No nodularity, asymmetry or nipple discharge bilaterally.  Gastrointestinal:   Abdominal Examination:  Abdomen nontender to palpation, tone normal without rigidity or guarding, no masses present, umbilicus without lesions   Liver and Spleen:  No hepatomegaly present, liver nontender to palpation    Hernias:  No hernias present  Lymphatic: Lymph Nodes:  No other lymphadenopathy present  Skin:  General Inspection:  No rashes present, no lesions present, no areas of  discoloration  Neurologic:    Mental Status:  Oriented X3.  Normal strength and tone, sensory exam                grossly normal, mentation intact and speech normal.    Psychiatric:   Mentation appears normal and affect normal/bright.         Pelvic Exam:  External Genitalia:     Normal " appearance for age, no discharge present, no tenderness present, no inflammatory lesions present, color normal  Vagina:     Normal vaginal vault without central or paravaginal defects, no discharge present, no inflammatory lesions present, no masses present  Bladder:     Nontender to palpation  Urethra:   Urethral Body:  Urethra palpation normal, urethra structural support normal   Urethral Meatus:  No erythema or lesions present  Cervix:     Appearance healthy, no lesions present, nontender to palpation, no bleeding present  Uterus:     Uterus: firm, normal sized and nontender, midplane in position.   Adnexa:     No adnexal tenderness present, no adnexal masses present  Perineum:     Perineum within normal limits, no evidence of trauma, no rashes or skin lesions present  Anus:     Anus within normal limits, no hemorrhoids present  Inguinal Lymph Nodes:     No lymphadenopathy present  Pubic Hair:     Normal pubic hair distribution for age  Genitalia and Groin:     No rashes present, no lesions present, no areas of discoloration, no masses present      COUNSELING:   Reviewed preventive health counseling, as reflected in patient instructions    BMI: Body mass index is 29.7 kg/m .      ASSESSMENT:  60 year old female with satisfactory annual exam.    ICD-10-CM    1. Encounter for gynecological examination without abnormal finding  Z01.419        PLAN:  She is taking Vit D  Schedule dexa   If abnormal then refer to endocrine  Establish care with internal medicine here in our building  Mammogram  Labs already done this years  Vit D level was ok      Arelis Larsen MD

## 2021-03-17 ENCOUNTER — OFFICE VISIT (OUTPATIENT)
Dept: OBGYN | Facility: CLINIC | Age: 61
End: 2021-03-17
Payer: COMMERCIAL

## 2021-03-17 ENCOUNTER — ANCILLARY PROCEDURE (OUTPATIENT)
Dept: MAMMOGRAPHY | Facility: CLINIC | Age: 61
End: 2021-03-17
Payer: COMMERCIAL

## 2021-03-17 ENCOUNTER — TRANSFERRED RECORDS (OUTPATIENT)
Dept: HEALTH INFORMATION MANAGEMENT | Facility: CLINIC | Age: 61
End: 2021-03-17

## 2021-03-17 VITALS
HEIGHT: 64 IN | SYSTOLIC BLOOD PRESSURE: 120 MMHG | DIASTOLIC BLOOD PRESSURE: 72 MMHG | BODY MASS INDEX: 29.53 KG/M2 | WEIGHT: 173 LBS

## 2021-03-17 DIAGNOSIS — F33.42 RECURRENT MAJOR DEPRESSIVE DISORDER, IN FULL REMISSION (H): ICD-10-CM

## 2021-03-17 DIAGNOSIS — O99.340 DEPRESSION AFFECTING PREGNANCY, ANTEPARTUM: ICD-10-CM

## 2021-03-17 DIAGNOSIS — Z01.419 ENCOUNTER FOR GYNECOLOGICAL EXAMINATION WITHOUT ABNORMAL FINDING: Primary | ICD-10-CM

## 2021-03-17 DIAGNOSIS — Z12.31 VISIT FOR SCREENING MAMMOGRAM: ICD-10-CM

## 2021-03-17 DIAGNOSIS — F32.A DEPRESSION AFFECTING PREGNANCY, ANTEPARTUM: ICD-10-CM

## 2021-03-17 DIAGNOSIS — Z13.820 SCREENING FOR OSTEOPOROSIS: ICD-10-CM

## 2021-03-17 PROCEDURE — 77063 BREAST TOMOSYNTHESIS BI: CPT | Mod: TC | Performed by: RADIOLOGY

## 2021-03-17 PROCEDURE — 77067 SCR MAMMO BI INCL CAD: CPT | Mod: TC | Performed by: RADIOLOGY

## 2021-03-17 PROCEDURE — 99396 PREV VISIT EST AGE 40-64: CPT | Performed by: OBSTETRICS & GYNECOLOGY

## 2021-03-17 RX ORDER — ESCITALOPRAM OXALATE 10 MG/1
10 TABLET ORAL DAILY
Qty: 90 TABLET | Refills: 3 | Status: SHIPPED | OUTPATIENT
Start: 2021-03-17

## 2021-03-17 RX ORDER — BUPROPION HYDROCHLORIDE 300 MG/1
300 TABLET ORAL EVERY MORNING
Qty: 90 TABLET | Refills: 3 | Status: SHIPPED | OUTPATIENT
Start: 2021-03-17

## 2021-03-17 RX ORDER — ESCITALOPRAM OXALATE 10 MG/1
10 TABLET ORAL DAILY
Qty: 90 TABLET | Refills: 3 | OUTPATIENT
Start: 2021-03-17

## 2021-03-17 RX ORDER — BUPROPION HYDROCHLORIDE 300 MG/1
300 TABLET ORAL EVERY MORNING
Qty: 90 TABLET | Refills: 3 | OUTPATIENT
Start: 2021-03-17

## 2021-03-17 ASSESSMENT — MIFFLIN-ST. JEOR: SCORE: 1329.72

## 2021-03-17 NOTE — TELEPHONE ENCOUNTER
New rx sent at annual OV with Dr. Larsen  Duplicate request.  Rx denied.    Deandra Liu RN on 3/17/2021 at 3:36 PM

## 2021-03-17 NOTE — TELEPHONE ENCOUNTER
New rx sent a annual OV with Dr. Larsen.  Duplicate request.  Rx denied.    Deandra Liu RN on 3/17/2021 at 3:38 PM

## 2021-03-17 NOTE — TELEPHONE ENCOUNTER
"Requested Prescriptions   Pending Prescriptions Disp Refills     buPROPion (WELLBUTRIN XL) 300 MG 24 hr tablet 90 tablet 3     Sig: Take 1 tablet (300 mg) by mouth every morning       SSRIs Protocol Failed - 3/17/2021  3:16 PM        Failed - PHQ-9 score less than 5 in past 6 months     Please review last PHQ-9 score.           Passed - Medication is Bupropion     If the medication is Bupropion (Wellbutrin), and the patient is taking for smoking cessation; OK to refill.          Passed - Medication is active on med list        Passed - Patient is age 18 or older        Passed - No active pregnancy on record        Passed - No positive pregnancy test in last 12 months        Passed - Recent (6 mo) or future (30 days) visit within the authorizing provider's specialty     Patient had office visit in the last 6 months or has a visit in the next 30 days with authorizing provider or within the authorizing provider's specialty.  See \"Patient Info\" tab in inbasket, or \"Choose Columns\" in Meds & Orders section of the refill encounter.               Last Written Prescription Date:  3/17/21  Last Fill Quantity: 90,  # refills: 3   Last office visit: 3/17/2021 with prescribing provider:  Suzie   Future Office Visit:  none          "

## 2021-03-17 NOTE — TELEPHONE ENCOUNTER
"Requested Prescriptions   Pending Prescriptions Disp Refills     escitalopram (LEXAPRO) 10 MG tablet 90 tablet 3     Sig: Take 1 tablet (10 mg) by mouth daily       SSRIs Protocol Failed - 3/17/2021  3:18 PM        Failed - PHQ-9 score less than 5 in past 6 months     Please review last PHQ-9 score.           Passed - Medication is active on med list        Passed - Patient is age 18 or older        Passed - No active pregnancy on record        Passed - No positive pregnancy test in last 12 months        Passed - Recent (6 mo) or future (30 days) visit within the authorizing provider's specialty     Patient had office visit in the last 6 months or has a visit in the next 30 days with authorizing provider or within the authorizing provider's specialty.  See \"Patient Info\" tab in inbasket, or \"Choose Columns\" in Meds & Orders section of the refill encounter.               Last Written Prescription Date:  3/17/21  Last Fill Quantity: 90,  # refills: 3   Last office visit: 3/17/2021 with prescribing provider:  Suzie   Future Office Visit:  none          "

## 2021-03-20 ENCOUNTER — HEALTH MAINTENANCE LETTER (OUTPATIENT)
Age: 61
End: 2021-03-20

## 2021-05-05 ENCOUNTER — ANCILLARY PROCEDURE (OUTPATIENT)
Dept: BONE DENSITY | Facility: CLINIC | Age: 61
End: 2021-05-05
Payer: COMMERCIAL

## 2021-05-05 DIAGNOSIS — Z13.820 SCREENING FOR OSTEOPOROSIS: ICD-10-CM

## 2021-05-05 DIAGNOSIS — Z78.0 ASYMPTOMATIC POSTMENOPAUSAL STATE: ICD-10-CM

## 2021-05-05 PROCEDURE — 77080 DXA BONE DENSITY AXIAL: CPT | Performed by: OBSTETRICS & GYNECOLOGY

## 2021-05-05 PROCEDURE — 77081 DXA BONE DENSITY APPENDICULR: CPT | Mod: 59 | Performed by: OBSTETRICS & GYNECOLOGY

## 2021-09-10 ENCOUNTER — TRANSFERRED RECORDS (OUTPATIENT)
Dept: HEALTH INFORMATION MANAGEMENT | Facility: CLINIC | Age: 61
End: 2021-09-10

## 2021-10-24 ENCOUNTER — HEALTH MAINTENANCE LETTER (OUTPATIENT)
Age: 61
End: 2021-10-24

## 2021-11-24 ENCOUNTER — TRANSFERRED RECORDS (OUTPATIENT)
Dept: HEALTH INFORMATION MANAGEMENT | Facility: CLINIC | Age: 61
End: 2021-11-24
Payer: COMMERCIAL

## 2021-12-14 ENCOUNTER — TRANSFERRED RECORDS (OUTPATIENT)
Dept: HEALTH INFORMATION MANAGEMENT | Facility: CLINIC | Age: 61
End: 2021-12-14
Payer: COMMERCIAL

## 2022-01-13 ENCOUNTER — TRANSFERRED RECORDS (OUTPATIENT)
Dept: HEALTH INFORMATION MANAGEMENT | Facility: CLINIC | Age: 62
End: 2022-01-13
Payer: COMMERCIAL

## 2022-04-13 ENCOUNTER — ANCILLARY PROCEDURE (OUTPATIENT)
Dept: MAMMOGRAPHY | Facility: CLINIC | Age: 62
End: 2022-04-13
Payer: COMMERCIAL

## 2022-04-13 DIAGNOSIS — Z12.31 VISIT FOR SCREENING MAMMOGRAM: ICD-10-CM

## 2022-04-13 PROCEDURE — 77063 BREAST TOMOSYNTHESIS BI: CPT | Mod: TC | Performed by: RADIOLOGY

## 2022-04-13 PROCEDURE — 77067 SCR MAMMO BI INCL CAD: CPT | Mod: TC | Performed by: RADIOLOGY

## 2022-06-05 ENCOUNTER — HEALTH MAINTENANCE LETTER (OUTPATIENT)
Age: 62
End: 2022-06-05

## 2022-10-16 ENCOUNTER — HEALTH MAINTENANCE LETTER (OUTPATIENT)
Age: 62
End: 2022-10-16

## 2023-04-25 ENCOUNTER — ANCILLARY PROCEDURE (OUTPATIENT)
Dept: MAMMOGRAPHY | Facility: CLINIC | Age: 63
End: 2023-04-25
Payer: COMMERCIAL

## 2023-04-25 DIAGNOSIS — Z12.31 VISIT FOR SCREENING MAMMOGRAM: ICD-10-CM

## 2023-04-25 PROCEDURE — 77067 SCR MAMMO BI INCL CAD: CPT | Mod: TC | Performed by: RADIOLOGY

## 2023-04-25 PROCEDURE — 77063 BREAST TOMOSYNTHESIS BI: CPT | Mod: TC | Performed by: RADIOLOGY

## 2024-01-13 ENCOUNTER — HEALTH MAINTENANCE LETTER (OUTPATIENT)
Age: 64
End: 2024-01-13

## 2024-02-22 ENCOUNTER — MEDICAL CORRESPONDENCE (OUTPATIENT)
Dept: SCHEDULING | Facility: CLINIC | Age: 64
End: 2024-02-22
Payer: COMMERCIAL

## 2024-04-03 ENCOUNTER — ANCILLARY PROCEDURE (OUTPATIENT)
Dept: BONE DENSITY | Facility: CLINIC | Age: 64
End: 2024-04-03
Attending: INTERNAL MEDICINE
Payer: COMMERCIAL

## 2024-04-03 ENCOUNTER — ANCILLARY PROCEDURE (OUTPATIENT)
Dept: MAMMOGRAPHY | Facility: CLINIC | Age: 64
End: 2024-04-03
Attending: INTERNAL MEDICINE
Payer: COMMERCIAL

## 2024-04-03 DIAGNOSIS — Z12.31 VISIT FOR SCREENING MAMMOGRAM: ICD-10-CM

## 2024-04-03 DIAGNOSIS — M81.0 OSTEOPOROSIS: ICD-10-CM

## 2024-04-03 PROCEDURE — 77080 DXA BONE DENSITY AXIAL: CPT | Mod: TC | Performed by: PHYSICIAN ASSISTANT

## 2024-04-03 PROCEDURE — 77067 SCR MAMMO BI INCL CAD: CPT | Mod: TC | Performed by: STUDENT IN AN ORGANIZED HEALTH CARE EDUCATION/TRAINING PROGRAM

## 2024-04-03 PROCEDURE — 77063 BREAST TOMOSYNTHESIS BI: CPT | Mod: TC | Performed by: STUDENT IN AN ORGANIZED HEALTH CARE EDUCATION/TRAINING PROGRAM

## 2025-01-25 ENCOUNTER — HEALTH MAINTENANCE LETTER (OUTPATIENT)
Age: 65
End: 2025-01-25

## 2025-04-09 ENCOUNTER — ANCILLARY PROCEDURE (OUTPATIENT)
Dept: MAMMOGRAPHY | Facility: CLINIC | Age: 65
End: 2025-04-09
Payer: COMMERCIAL

## 2025-04-09 DIAGNOSIS — Z12.31 VISIT FOR SCREENING MAMMOGRAM: ICD-10-CM

## 2025-04-09 PROCEDURE — 77063 BREAST TOMOSYNTHESIS BI: CPT | Mod: TC | Performed by: RADIOLOGY

## 2025-04-09 PROCEDURE — 77067 SCR MAMMO BI INCL CAD: CPT | Mod: TC | Performed by: RADIOLOGY

## 2025-04-18 ENCOUNTER — HOSPITAL ENCOUNTER (OUTPATIENT)
Facility: CLINIC | Age: 65
Setting detail: OBSERVATION
Discharge: HOME | End: 2025-04-20
Attending: EMERGENCY MEDICINE | Admitting: INTERNAL MEDICINE
Payer: COMMERCIAL

## 2025-04-18 DIAGNOSIS — R10.32 LEFT LOWER QUADRANT ABDOMINAL PAIN: ICD-10-CM

## 2025-04-18 PROCEDURE — 99285 EMERGENCY DEPT VISIT HI MDM: CPT | Mod: 25

## 2025-04-19 ENCOUNTER — APPOINTMENT (OUTPATIENT)
Dept: CT IMAGING | Facility: CLINIC | Age: 65
End: 2025-04-19
Attending: EMERGENCY MEDICINE
Payer: COMMERCIAL

## 2025-04-19 PROBLEM — R10.32 LEFT LOWER QUADRANT ABDOMINAL PAIN: Status: ACTIVE | Noted: 2025-04-19

## 2025-04-19 LAB
ALBUMIN SERPL BCG-MCNC: 4.1 G/DL (ref 3.5–5.2)
ALBUMIN UR-MCNC: NEGATIVE MG/DL
ALP SERPL-CCNC: 78 U/L (ref 40–150)
ALT SERPL W P-5'-P-CCNC: 22 U/L (ref 0–50)
ANION GAP SERPL CALCULATED.3IONS-SCNC: 14 MMOL/L (ref 7–15)
APPEARANCE UR: CLEAR
AST SERPL W P-5'-P-CCNC: 20 U/L (ref 0–45)
BASOPHILS # BLD AUTO: 0.1 10E3/UL (ref 0–0.2)
BASOPHILS NFR BLD AUTO: 2 %
BILIRUB SERPL-MCNC: <0.2 MG/DL
BILIRUB UR QL STRIP: NEGATIVE
BUN SERPL-MCNC: 14.9 MG/DL (ref 8–23)
CALCIUM SERPL-MCNC: 9.1 MG/DL (ref 8.8–10.4)
CHLORIDE SERPL-SCNC: 101 MMOL/L (ref 98–107)
COLOR UR AUTO: ABNORMAL
CREAT SERPL-MCNC: 0.79 MG/DL (ref 0.51–0.95)
EGFRCR SERPLBLD CKD-EPI 2021: 82 ML/MIN/1.73M2
EOSINOPHIL # BLD AUTO: 0.3 10E3/UL (ref 0–0.7)
EOSINOPHIL NFR BLD AUTO: 5 %
ERYTHROCYTE [DISTWIDTH] IN BLOOD BY AUTOMATED COUNT: 12.8 % (ref 10–15)
GLUCOSE SERPL-MCNC: 110 MG/DL (ref 70–99)
GLUCOSE UR STRIP-MCNC: NEGATIVE MG/DL
HCO3 SERPL-SCNC: 24 MMOL/L (ref 22–29)
HCT VFR BLD AUTO: 41.7 % (ref 35–47)
HGB BLD-MCNC: 13.8 G/DL (ref 11.7–15.7)
HGB UR QL STRIP: NEGATIVE
HOLD SPECIMEN: NORMAL
HOLD SPECIMEN: NORMAL
IMM GRANULOCYTES # BLD: 0 10E3/UL
IMM GRANULOCYTES NFR BLD: 1 %
KETONES UR STRIP-MCNC: ABNORMAL MG/DL
LACTATE SERPL-SCNC: 0.5 MMOL/L (ref 0.7–2)
LEUKOCYTE ESTERASE UR QL STRIP: ABNORMAL
LIPASE SERPL-CCNC: 39 U/L (ref 13–60)
LYMPHOCYTES # BLD AUTO: 2.3 10E3/UL (ref 0.8–5.3)
LYMPHOCYTES NFR BLD AUTO: 37 %
MCH RBC QN AUTO: 30.6 PG (ref 26.5–33)
MCHC RBC AUTO-ENTMCNC: 33.1 G/DL (ref 31.5–36.5)
MCV RBC AUTO: 93 FL (ref 78–100)
MONOCYTES # BLD AUTO: 0.6 10E3/UL (ref 0–1.3)
MONOCYTES NFR BLD AUTO: 9 %
MUCOUS THREADS #/AREA URNS LPF: PRESENT /LPF
NEUTROPHILS # BLD AUTO: 3 10E3/UL (ref 1.6–8.3)
NEUTROPHILS NFR BLD AUTO: 47 %
NITRATE UR QL: NEGATIVE
NRBC # BLD AUTO: 0 10E3/UL
NRBC BLD AUTO-RTO: 0 /100
PH UR STRIP: 5.5 [PH] (ref 5–7)
PLATELET # BLD AUTO: 274 10E3/UL (ref 150–450)
POTASSIUM SERPL-SCNC: 3.9 MMOL/L (ref 3.4–5.3)
PROT SERPL-MCNC: 6.5 G/DL (ref 6.4–8.3)
RBC # BLD AUTO: 4.51 10E6/UL (ref 3.8–5.2)
RBC URINE: 2 /HPF
SODIUM SERPL-SCNC: 139 MMOL/L (ref 135–145)
SP GR UR STRIP: 1.03 (ref 1–1.03)
SQUAMOUS EPITHELIAL: <1 /HPF
UROBILINOGEN UR STRIP-MCNC: NORMAL MG/DL
WBC # BLD AUTO: 6.3 10E3/UL (ref 4–11)
WBC URINE: 13 /HPF

## 2025-04-19 PROCEDURE — 85025 COMPLETE CBC W/AUTO DIFF WBC: CPT | Performed by: EMERGENCY MEDICINE

## 2025-04-19 PROCEDURE — 250N000011 HC RX IP 250 OP 636: Performed by: EMERGENCY MEDICINE

## 2025-04-19 PROCEDURE — 80053 COMPREHEN METABOLIC PANEL: CPT | Performed by: EMERGENCY MEDICINE

## 2025-04-19 PROCEDURE — 83605 ASSAY OF LACTIC ACID: CPT | Performed by: EMERGENCY MEDICINE

## 2025-04-19 PROCEDURE — 250N000013 HC RX MED GY IP 250 OP 250 PS 637: Performed by: SURGERY

## 2025-04-19 PROCEDURE — 250N000013 HC RX MED GY IP 250 OP 250 PS 637: Performed by: INTERNAL MEDICINE

## 2025-04-19 PROCEDURE — G0378 HOSPITAL OBSERVATION PER HR: HCPCS

## 2025-04-19 PROCEDURE — 250N000009 HC RX 250: Performed by: EMERGENCY MEDICINE

## 2025-04-19 PROCEDURE — 99222 1ST HOSP IP/OBS MODERATE 55: CPT | Performed by: INTERNAL MEDICINE

## 2025-04-19 PROCEDURE — 87086 URINE CULTURE/COLONY COUNT: CPT | Performed by: EMERGENCY MEDICINE

## 2025-04-19 PROCEDURE — 74177 CT ABD & PELVIS W/CONTRAST: CPT

## 2025-04-19 PROCEDURE — 81001 URINALYSIS AUTO W/SCOPE: CPT | Performed by: EMERGENCY MEDICINE

## 2025-04-19 PROCEDURE — 36415 COLL VENOUS BLD VENIPUNCTURE: CPT | Performed by: EMERGENCY MEDICINE

## 2025-04-19 PROCEDURE — 83690 ASSAY OF LIPASE: CPT | Performed by: EMERGENCY MEDICINE

## 2025-04-19 PROCEDURE — 258N000003 HC RX IP 258 OP 636: Performed by: INTERNAL MEDICINE

## 2025-04-19 PROCEDURE — 96374 THER/PROPH/DIAG INJ IV PUSH: CPT

## 2025-04-19 PROCEDURE — 250N000013 HC RX MED GY IP 250 OP 250 PS 637: Performed by: EMERGENCY MEDICINE

## 2025-04-19 RX ORDER — ACETAMINOPHEN 325 MG/1
975 TABLET ORAL 3 TIMES DAILY
Status: DISCONTINUED | OUTPATIENT
Start: 2025-04-19 | End: 2025-04-20 | Stop reason: HOSPADM

## 2025-04-19 RX ORDER — BUPROPION HYDROCHLORIDE 150 MG/1
450 TABLET ORAL EVERY MORNING
Status: DISCONTINUED | OUTPATIENT
Start: 2025-04-19 | End: 2025-04-20 | Stop reason: HOSPADM

## 2025-04-19 RX ORDER — AMOXICILLIN 250 MG
2 CAPSULE ORAL 2 TIMES DAILY PRN
Status: DISCONTINUED | OUTPATIENT
Start: 2025-04-19 | End: 2025-04-20 | Stop reason: HOSPADM

## 2025-04-19 RX ORDER — IOPAMIDOL 755 MG/ML
103 INJECTION, SOLUTION INTRAVASCULAR ONCE
Status: COMPLETED | OUTPATIENT
Start: 2025-04-19 | End: 2025-04-19

## 2025-04-19 RX ORDER — BISACODYL 10 MG
10 SUPPOSITORY, RECTAL RECTAL DAILY PRN
Status: DISCONTINUED | OUTPATIENT
Start: 2025-04-19 | End: 2025-04-20 | Stop reason: HOSPADM

## 2025-04-19 RX ORDER — SODIUM CHLORIDE, SODIUM LACTATE, POTASSIUM CHLORIDE, CALCIUM CHLORIDE 600; 310; 30; 20 MG/100ML; MG/100ML; MG/100ML; MG/100ML
INJECTION, SOLUTION INTRAVENOUS CONTINUOUS
Status: DISCONTINUED | OUTPATIENT
Start: 2025-04-19 | End: 2025-04-20 | Stop reason: HOSPADM

## 2025-04-19 RX ORDER — ESCITALOPRAM OXALATE 5 MG/1
15 TABLET ORAL DAILY
Status: DISCONTINUED | OUTPATIENT
Start: 2025-04-19 | End: 2025-04-20 | Stop reason: HOSPADM

## 2025-04-19 RX ORDER — NALOXONE HYDROCHLORIDE 0.4 MG/ML
0.4 INJECTION, SOLUTION INTRAMUSCULAR; INTRAVENOUS; SUBCUTANEOUS
Status: DISCONTINUED | OUTPATIENT
Start: 2025-04-19 | End: 2025-04-20 | Stop reason: HOSPADM

## 2025-04-19 RX ORDER — BUPROPION HYDROCHLORIDE 150 MG/1
150 TABLET ORAL EVERY MORNING
COMMUNITY

## 2025-04-19 RX ORDER — HYDROMORPHONE HYDROCHLORIDE 2 MG/1
2 TABLET ORAL EVERY 4 HOURS PRN
Status: DISCONTINUED | OUTPATIENT
Start: 2025-04-19 | End: 2025-04-20 | Stop reason: HOSPADM

## 2025-04-19 RX ORDER — ONDANSETRON 4 MG/1
4 TABLET, ORALLY DISINTEGRATING ORAL EVERY 6 HOURS PRN
Status: DISCONTINUED | OUTPATIENT
Start: 2025-04-19 | End: 2025-04-20 | Stop reason: HOSPADM

## 2025-04-19 RX ORDER — ALENDRONATE SODIUM 70 MG/1
70 TABLET ORAL
COMMUNITY

## 2025-04-19 RX ORDER — CETIRIZINE HYDROCHLORIDE 10 MG/1
10 TABLET ORAL DAILY
COMMUNITY

## 2025-04-19 RX ORDER — PROCHLORPERAZINE MALEATE 5 MG/1
5 TABLET ORAL EVERY 6 HOURS PRN
Status: DISCONTINUED | OUTPATIENT
Start: 2025-04-19 | End: 2025-04-20 | Stop reason: HOSPADM

## 2025-04-19 RX ORDER — NALOXONE HYDROCHLORIDE 0.4 MG/ML
0.2 INJECTION, SOLUTION INTRAMUSCULAR; INTRAVENOUS; SUBCUTANEOUS
Status: DISCONTINUED | OUTPATIENT
Start: 2025-04-19 | End: 2025-04-20 | Stop reason: HOSPADM

## 2025-04-19 RX ORDER — ONDANSETRON 2 MG/ML
4 INJECTION INTRAMUSCULAR; INTRAVENOUS EVERY 6 HOURS PRN
Status: DISCONTINUED | OUTPATIENT
Start: 2025-04-19 | End: 2025-04-20 | Stop reason: HOSPADM

## 2025-04-19 RX ORDER — HYDRALAZINE HYDROCHLORIDE 20 MG/ML
10 INJECTION INTRAMUSCULAR; INTRAVENOUS EVERY 4 HOURS PRN
Status: DISCONTINUED | OUTPATIENT
Start: 2025-04-19 | End: 2025-04-20 | Stop reason: HOSPADM

## 2025-04-19 RX ORDER — HYDROMORPHONE HCL IN WATER/PF 6 MG/30 ML
0.2 PATIENT CONTROLLED ANALGESIA SYRINGE INTRAVENOUS
Status: DISCONTINUED | OUTPATIENT
Start: 2025-04-19 | End: 2025-04-20 | Stop reason: HOSPADM

## 2025-04-19 RX ORDER — AMOXICILLIN 250 MG
1 CAPSULE ORAL 2 TIMES DAILY PRN
Status: DISCONTINUED | OUTPATIENT
Start: 2025-04-19 | End: 2025-04-20 | Stop reason: HOSPADM

## 2025-04-19 RX ORDER — POLYETHYLENE GLYCOL 3350 17 G/17G
17 POWDER, FOR SOLUTION ORAL 2 TIMES DAILY
Status: DISCONTINUED | OUTPATIENT
Start: 2025-04-19 | End: 2025-04-20 | Stop reason: HOSPADM

## 2025-04-19 RX ORDER — ONDANSETRON 2 MG/ML
4 INJECTION INTRAMUSCULAR; INTRAVENOUS ONCE
Status: COMPLETED | OUTPATIENT
Start: 2025-04-19 | End: 2025-04-19

## 2025-04-19 RX ORDER — HYDROMORPHONE HCL IN WATER/PF 6 MG/30 ML
0.4 PATIENT CONTROLLED ANALGESIA SYRINGE INTRAVENOUS
Status: DISCONTINUED | OUTPATIENT
Start: 2025-04-19 | End: 2025-04-20 | Stop reason: HOSPADM

## 2025-04-19 RX ORDER — IPRATROPIUM BROMIDE 21 UG/1
2 SPRAY, METERED NASAL DAILY
COMMUNITY

## 2025-04-19 RX ADMIN — ESCITALOPRAM OXALATE 15 MG: 5 TABLET, FILM COATED ORAL at 13:32

## 2025-04-19 RX ADMIN — SODIUM CHLORIDE 69 ML: 9 INJECTION, SOLUTION INTRAVENOUS at 04:49

## 2025-04-19 RX ADMIN — ONDANSETRON 4 MG: 2 INJECTION, SOLUTION INTRAMUSCULAR; INTRAVENOUS at 05:09

## 2025-04-19 RX ADMIN — SIMETHICONE 226 ML: 125 CAPSULE, LIQUID FILLED ORAL at 07:01

## 2025-04-19 RX ADMIN — HYDROMORPHONE HYDROCHLORIDE 2 MG: 2 TABLET ORAL at 11:07

## 2025-04-19 RX ADMIN — SODIUM CHLORIDE, SODIUM LACTATE, POTASSIUM CHLORIDE, AND CALCIUM CHLORIDE: .6; .31; .03; .02 INJECTION, SOLUTION INTRAVENOUS at 23:05

## 2025-04-19 RX ADMIN — ACETAMINOPHEN 975 MG: 325 TABLET, FILM COATED ORAL at 20:52

## 2025-04-19 RX ADMIN — BUPROPION HYDROCHLORIDE 450 MG: 150 TABLET, EXTENDED RELEASE ORAL at 13:32

## 2025-04-19 RX ADMIN — SODIUM CHLORIDE, SODIUM LACTATE, POTASSIUM CHLORIDE, AND CALCIUM CHLORIDE: .6; .31; .03; .02 INJECTION, SOLUTION INTRAVENOUS at 13:32

## 2025-04-19 RX ADMIN — IOPAMIDOL 103 ML: 755 INJECTION, SOLUTION INTRAVENOUS at 04:49

## 2025-04-19 RX ADMIN — SIMETHICONE 226 ML: 125 CAPSULE, LIQUID FILLED ORAL at 13:33

## 2025-04-19 RX ADMIN — POLYETHYLENE GLYCOL 3350 17 G: 17 POWDER, FOR SOLUTION ORAL at 20:53

## 2025-04-19 ASSESSMENT — ACTIVITIES OF DAILY LIVING (ADL)
ADLS_ACUITY_SCORE: 51

## 2025-04-19 ASSESSMENT — COLUMBIA-SUICIDE SEVERITY RATING SCALE - C-SSRS
2. HAVE YOU ACTUALLY HAD ANY THOUGHTS OF KILLING YOURSELF IN THE PAST MONTH?: NO
6. HAVE YOU EVER DONE ANYTHING, STARTED TO DO ANYTHING, OR PREPARED TO DO ANYTHING TO END YOUR LIFE?: NO
1. IN THE PAST MONTH, HAVE YOU WISHED YOU WERE DEAD OR WISHED YOU COULD GO TO SLEEP AND NOT WAKE UP?: NO

## 2025-04-19 NOTE — ED PROVIDER NOTES
"  Emergency Department Note      History of Present Illness     Chief Complaint   Abdominal Pain      HPI   Faustina Morales is a 64 year old female with a history of hyperlipidemia presenting with abdominal pain. The patient reports developing severe left-sided abdominal and back pain at 2315 last night, stating it woke up her up from sleep. Her pain has somewhat dissipated since, stating it now feels dull. She is also nauseous, which she thinks may be due to the severity of her pain. Patient denies urinary symptoms. She does not have a history of kidney stones.     Independent Historian   None    Past Medical History     Medical History and Problem List   Allergic rhinitis due to pollen  Breast ductal hyperplasia  Goiter  MORE  Anxiety  Hyperlipidemia  Melanoma in situ of cheek  Osteporosis  Lichen sclerosus  Spinal stenosis    Medications   Aspirin (asa) 325 mg   Bupropion   Escitalopram   Naltrexone  Alendronate      Surgical History   Left breast biopsy  Left hip closed reduction   Retinal detachment surgery   Left parotidectomy     Physical Exam     Patient Vitals for the past 24 hrs:   BP Temp Temp src Pulse Resp SpO2 Height Weight   04/18/25 2357 (!) 154/91 97  F (36.1  C) Temporal 82 16 97 % 1.626 m (5' 4\") 93 kg (205 lb)     Physical Exam  General: Resting on the gurney, appears uncomfortable  Head:  The scalp, face, and head appear normal  Mouth/Throat: Mucus membranes are moist  CV:  Regular rate    Normal S1 and S2  No pathological murmur   Resp:  Breath sounds clear and equal bilaterally    Non-labored, no retractions or accessory muscle use    No coarseness    No wheezing   GI:  Abdomen is soft, no rigidity    Moderate left-sided tenderness to palpation. No guarding, no rebound.   MS:  Normal motor assessment of all extremities.    Good capillary refill noted.    Slight left sided CVA tenderness to percussion  Skin:  No rash or lesions noted.  Neuro:   Speech is normal and fluent. No apparent " deficit.  Psych: Awake. Alert.  Normal affect.      Appropriate interactions.     Diagnostics     Lab Results   Labs Ordered and Resulted from Time of ED Arrival to Time of ED Departure   COMPREHENSIVE METABOLIC PANEL - Abnormal       Result Value    Sodium 139      Potassium 3.9      Carbon Dioxide (CO2) 24      Anion Gap 14      Urea Nitrogen 14.9      Creatinine 0.79      GFR Estimate 82      Calcium 9.1      Chloride 101      Glucose 110 (*)     Alkaline Phosphatase 78      AST 20      ALT 22      Protein Total 6.5      Albumin 4.1      Bilirubin Total <0.2     ROUTINE UA WITH MICROSCOPIC REFLEX TO CULTURE - Abnormal    Color Urine Light Yellow      Appearance Urine Clear      Glucose Urine Negative      Bilirubin Urine Negative      Ketones Urine Trace (*)     Specific Gravity Urine 1.026      Blood Urine Negative      pH Urine 5.5      Protein Albumin Urine Negative      Urobilinogen Urine Normal      Nitrite Urine Negative      Leukocyte Esterase Urine Small (*)     Mucus Urine Present (*)     RBC Urine 2      WBC Urine 13 (*)     Squamous Epithelials Urine <1     LACTIC ACID WHOLE BLOOD WITH 1X REPEAT IN 2 HR WHEN >2 - Abnormal    Lactic Acid, Initial 0.5 (*)    LIPASE - Normal    Lipase 39     CBC WITH PLATELETS AND DIFFERENTIAL    WBC Count 6.3      RBC Count 4.51      Hemoglobin 13.8      Hematocrit 41.7      MCV 93      MCH 30.6      MCHC 33.1      RDW 12.8      Platelet Count 274      % Neutrophils 47      % Lymphocytes 37      % Monocytes 9      % Eosinophils 5      % Basophils 2      % Immature Granulocytes 1      NRBCs per 100 WBC 0      Absolute Neutrophils 3.0      Absolute Lymphocytes 2.3      Absolute Monocytes 0.6      Absolute Eosinophils 0.3      Absolute Basophils 0.1      Absolute Immature Granulocytes 0.0      Absolute NRBCs 0.0     URINE CULTURE       Imaging   CT Abdomen Pelvis w Contrast   Final Result   IMPRESSION:    1.  Slight mesenteric stranding in the left lower quadrant and  minimal stranding lateral to the left colon, nonspecific. There are several loops of compartmentalized small bowel in the left lower abdomen. There is also slight tortuosity of colon at this    level. Possibility of a nonobstructing internal hernia is not excluded. No bowel dilatation. Consider short-term follow-up if this corresponds with region of pain.   2.  8 mm right renal lesion measures higher density than simple cyst. Nonurgent ultrasound follow-up could be obtained for characterization.   3.  Fibroid uterus.   4.  Hepatic cysts.           Independent Interpretation   None    ED Course      Medications Administered   Medications   iopamidol (ISOVUE-370) solution 103 mL (103 mLs Intravenous $Given 4/19/25 0449)   sodium chloride 0.9 % bag for CT scan flush (69 mLs Intravenous $Given 4/19/25 0449)   ondansetron (ZOFRAN) injection 4 mg (4 mg Intravenous $Given 4/19/25 0500)       Procedures   Procedures     Discussion of Management   None    ED Course   ED Course as of 04/19/25 0650   Sat Apr 19, 2025   0321 I obtained the history and examined the patient as noted above.     0625 I spoke to [place] [facility], Dr. Barahona in regards to a previous read; he reviewed ct images and  believed imaging was more consistent with constipation,did not see evidence of internal hernia.    0632 I rechecked the patient and explained findings.    0800 patient rechecked and has persistent pain.  I spoke to our on-call general surgeon who will observe her for repeat abdominal exams.  0855 discussed case with the admitting hospitalist, Dr. Don who will admit the patient.    Additional Documentation  None    Medical Decision Making / Diagnosis     MIRIAM Morales is a 64 year old female  who presents for evaluation significant left-sided abdominal pain.  She does note that she had a particularly hard bowel movement earlier this evening but that her bowel movement did not seem to impact her pain.  She states she has never  had pain like this before.  CT imaging was obtained with significant stool burden but also several abnormalities as noted above.  After discussion with radiology we attempted an enema to see if her pain improved.  Unfortunately it did not.  Given her imaging and ongoing pain I discussed the case with the on-call general surgeon who graciously agreed to see the patient and review the imaging.  At this time she will be admitted for serial abdominal exams to the hospitalist service.  .    Disposition   The patient was admitted     Diagnosis     ICD-10-CM    1. Left lower quadrant abdominal pain  R10.32              Scribe Disclosure:  I, Kathy Head, am serving as a scribe at 3:39 AM on 4/19/2025 to document services personally performed by Deepika Flores MD based on my observations and the provider's statements to me.        Deepika Flores MD  04/19/25 0959

## 2025-04-19 NOTE — H&P
"[facility]    History and Physical  Hospitalist       Date of Admission:  4/18/2025    Assessment & Plan   Faustina Morales is a 64 year old female with PMH of HTN, anxiety, spinal stenosis, MORE, who presents with LLQ pain, possibly related to colonic obstruction.    LLQ pain  Constipation, rule out colonic obstruction  Patient presents with sudden onset of abdominal pain while sleeping the day before admission at 11pm. In ED noted LLQ pain radiating to the back, normal vital signs, normal BMP and CBC. Normal lactate. CT shows,   \"Slight mesenteric stranding in the left lower quadrant and minimal stranding lateral to the left colon, nonspecific. There are several loops of compartmentalized small bowel in the left lower abdomen. There is also slight tortuosity of colon at this level. Possibility of a nonobstructing internal hernia is not excluded. No bowel dilatation. Consider short-term follow-up if this corresponds with region of pain.\"   General surgery was consulted in the ED who note a stool load in the ascending/right colon up to the left lower quadrant, they suspect constipation. Differential includes an internal hernia, local diverticulitis, volvulus (less likely), appendagitis.  - General surgery consult appreciated  - Pain control with scheduled tylenol, dilaudid oral and IV PRN, discussed with patient that opioids may worsen constipation if that is the primary etiology  - Repeat enema + scheduled miralax + enema PRN  - Patient reports an aborted colonoscopy in June 2024 with [facility] despite double prep. She has a colonoscopy in 2019 which noted incomplete prep. CT colonography June 2024 showed a relatively normal colon. Patient will try and get colonoscopy results today--follow up tomorrow. Will consult [facility] as well  - Clear liquid diet today, NPO at midnight  - Antiemetics PRN  - LR @ 100ml/hr    Right renal cyst:  CT this admission shows subcentimeter right renal cyst, previous " "CT imaging June 2024 suggested a simple hemorrhagic cyst.  - Outpatient follow up    HTN: /91 here, not on any antihypertensive  - Hydralazine IV for SBP > 180    Anxiety: Continue PTA Bupropion, Lexapro    Clinically Significant Risk Factors Present on Admission                 # Drug Induced Platelet Defect: home medication list includes an antiplatelet medication             # Obesity: Estimated body mass index is 35.19 kg/m  as calculated from the following:    Height as of this encounter: 1.626 m (5' 4\").    Weight as of this encounter: 93 kg (205 lb).               PT/OT: not needed  Diet: Clear liquid  DVT Prophylaxis: Pneumatic Compression Devices  Ordonez Catheter: Not present  Lines: None     Cardiac Monitoring: None  Code Status: Full code  Medically Ready for Discharge: Anticipated Tomorrow      Bartolo Don MD  Hospitalist Service  [facility]  Securely message with [employer] (more info)  Text page via [facility] Paging/Directory     Primary Care Physician   Meeta Bhardwaj    Chief Complaint   LLQ abdominal pain    History is obtained from the patient  Case discussed with ED provider    History of Present Illness   Faustina Morales is a 64 year old female who presents with LLQ abdominal pain. She went to sleep at 9PM yesterday, felt normal. At 1115 PM she woke up with excruciating abdominal pain in  her LLQ, she describes it as sharp, \"just hurts,\" radiating to left back. Never had pain like this before. It didn't get any better so she went to the ED, where she was in the waiting room for 3.5 hours. She notes that the pain did not worsen during this waiting time. When she was roomed she had a CT scan and than later an enema, with some stool output, and some mild improvement. She endorses significant nausea which improves with antiemetics. Denies fevers, chills, SOB, chest pain. Reports normal brown stool no blood. Normal urination. She reports a recent colonoscopy in " June 2024 which could not be completed despite a double prep, she then had a CT colon test at [facility].    Past Medical History    I have reviewed this patient's medical history and updated it with pertinent information if needed.   Past Medical History:   Diagnosis Date    Allergic rhinitis due to pollen     Breast ductal hyperplasia, atypical     Tamoxifen X 5 years    Goiter 01/01/2012    multinodular. Saw endocrine. Had FNA    Hip fracture (H) 02/03/2021    Lichen sclerosus     Spinal stenosis     In neck. Diagnosed by MRI. Did PT and improved.       Past Surgical History   I have reviewed this patient's surgical history and updated it with pertinent information if needed.  Past Surgical History:   Procedure Laterality Date    BIOPSY BREAST      CLOSED REDUCTION, PERCUTANEOUS PINNING HIP Left 02/04/2021    Procedure: CLOSED REDUCTION LEFT FEMORAL NECK FRACTURE WITH PERCUTANEOUS PINNING.;  Surgeon: Pop Good MD;  Location:  OR    EYE SURGERY      retinal detachment    HIP SURGERY  02/04/2021    left hip surgery    PAROTIDECTOMY Left 2018       Prior to Admission Medications   Prior to Admission Medications   Prescriptions Last Dose Informant Patient Reported? Taking?   Calcium Carbonate-Vitamin D (CALCIUM + D PO)  Self Yes No   Sig: Take 1 tablet by mouth daily    Omega-3 Fatty Acids (OMEGA-3 FISH OIL PO)  Self Yes No   Sig: Take 2 g by mouth daily    acetaminophen (TYLENOL) 500 MG tablet   No No   Sig: Take 2 tablets (1,000 mg) by mouth 3 times daily   Patient not taking: Reported on 3/17/2021   aspirin (ASA) 325 MG EC tablet   No No   Sig: Take 1 tablet (325 mg) by mouth daily   Patient not taking: Reported on 3/17/2021   buPROPion (WELLBUTRIN XL) 300 MG 24 hr tablet   No No   Sig: Take 1 tablet (300 mg) by mouth every morning   budesonide (RINOCORT AQUA) 32 MCG/ACT nasal spray  Self No No   Sig: Spray 1 spray into both nostrils daily   docusate sodium (COLACE) 100 MG capsule   No No   Sig:  Take 1 capsule (100 mg) by mouth 2 times daily   Patient not taking: Reported on 3/17/2021   escitalopram (LEXAPRO) 10 MG tablet   No No   Sig: Take 1 tablet (10 mg) by mouth daily   hydrOXYzine (ATARAX) 25 MG tablet   No No   Sig: Take 1 tablet (25 mg) by mouth every 6 hours as needed for other (adjuvant pain)   Patient not taking: Reported on 3/17/2021   multivitamin w/minerals (THERA-VIT-M) tablet  Self Yes No   Sig: Take 1 tablet by mouth daily   oxyCODONE (ROXICODONE) 5 MG tablet   No No   Sig: Take 1-2 tablets (5-10 mg) by mouth every 3 hours as needed for severe pain   Patient not taking: Reported on 3/17/2021      Facility-Administered Medications: None     Allergies   Allergies   Allergen Reactions    Bacitracin-Neomycin-Polymyxin      PN: LW Reaction: Contact Dermatitis       Social History   I have reviewed this patient's social history and updated it with pertinent information if needed. Faustina Morales  reports that she has never smoked. She has never used smokeless tobacco. She reports current alcohol use of about 21.0 standard drinks of alcohol per week. She reports that she does not use drugs.    Family History   I have reviewed this patient's family history and updated it with pertinent information if needed.   Family History   Problem Relation Age of Onset    Alzheimer Disease Other     Diabetes Other     Hyperlipidemia Other     Hypertension Father     Heart Disease Father 50       Review of Systems   The 10 point Review of Systems is negative other than noted in the HPI or here.    Physical Exam   Temp: 97  F (36.1  C) Temp src: Temporal BP: (!) 154/91 Pulse: 82   Resp: 16 SpO2: 92 % O2 Device: None (Room air)    Vital Signs with Ranges  Temp:  [97  F (36.1  C)] 97  F (36.1  C)  Pulse:  [82] 82  Resp:  [16] 16  BP: (154)/(91) 154/91  SpO2:  [92 %-97 %] 92 %  205 lbs 0 oz    Constitutional: Obese female in NAD  Eyes: Nonicteric, normal ocular movements  HEENT: Normocephalic, atraumatic, oral  mucosa moist  Respiratory: CTAB, no wheezing or crackles  Cardiovascular: RRR, normal S1/2, no m/r/g  GI: No organomegaly, normoactive bowel sounds, noted LLQ tenderness, soft and nondistended  Vascular: Trace to 1+ nonpitting bilatereal lower extremity edema  Skin: No rashes or scars  Musculoskeletal: Moves all extremities  Neurologic: A&Ox3  Psychiatric: Appropriate affect and mood    Data   Data reviewed today:  I personally reviewed labwork, CT.  Recent Labs   Lab 04/19/25  0010   WBC 6.3   HGB 13.8   MCV 93         POTASSIUM 3.9   CHLORIDE 101   CO2 24   BUN 14.9   CR 0.79   ANIONGAP 14   ELOISE 9.1   *   ALBUMIN 4.1   PROTTOTAL 6.5   BILITOTAL <0.2   ALKPHOS 78   ALT 22   AST 20   LIPASE 39       Imaging:  Recent Results (from the past 24 hours)   CT Abdomen Pelvis w Contrast    Narrative    EXAM: CT ABDOMEN PELVIS W CONTRAST  LOCATION: [facility]  DATE: 4/19/2025    INDICATION: llq and L cva ttp  COMPARISON: 06/13/2024  TECHNIQUE: CT scan of the abdomen and pelvis was performed following injection of IV contrast. Multiplanar reformats were obtained. Dose reduction techniques were used.  CONTRAST: 103mL Isovue 370    FINDINGS:   LOWER CHEST: Basilar atelectasis.    HEPATOBILIARY: Hepatic cysts. Subcentimeter hypodensities small for characterization.    PANCREAS: Normal.    SPLEEN: Normal.    ADRENAL GLANDS: Normal.    KIDNEYS/BLADDER: 8 mm lesion posterior right kidney S4, 7 1 measures higher density than simple cyst    BOWEL: Normal caliber. There is slight mesenteric stranding in the left lower quadrant series 4 images 120 - 130 and minimal stranding lateral to the left colon. Several loops of small bowel are slightly compartmentalized in the left lower quadrant near   the stranding series 4 images 85-1 20. Appendix is normal.    LYMPH NODES: Normal.    VASCULATURE: Normal.    PELVIC ORGANS: Fibroid uterus    MUSCULOSKELETAL: Left hip arthroplasty. Degenerative  change osseous structures.      Impression    IMPRESSION:   1.  Slight mesenteric stranding in the left lower quadrant and minimal stranding lateral to the left colon, nonspecific. There are several loops of compartmentalized small bowel in the left lower abdomen. There is also slight tortuosity of colon at this   level. Possibility of a nonobstructing internal hernia is not excluded. No bowel dilatation. Consider short-term follow-up if this corresponds with region of pain.  2.  8 mm right renal lesion measures higher density than simple cyst. Nonurgent ultrasound follow-up could be obtained for characterization.  3.  Fibroid uterus.  4.  Hepatic cysts.

## 2025-04-19 NOTE — PHARMACY-ADMISSION MEDICATION HISTORY
Pharmacy Intern Admission Medication History    Admission medication history is complete. The information provided in this note is only as accurate as the sources available at the time of the update.    Information Source(s): Patient and CareEverywhere/SureScripts via in-person    Pertinent Information: none    Changes made to PTA medication list:  Added: alendronate, bupropion  mg, cetirizine, ipratropium nasal spray, zinc (strength unknown to patient), vitamin D3-K2 (strength unknown to patient)  Deleted: acetaminophen, aspirin 325 mg, docusate, hydroxyzine, oxycodone  Changed:   bupropion XL: 300 mg daily -> 450 mg daily (totaled from 300 mg tablet + 150 mg tablet)  escitalopram 10 mg tablets: 1 tablet (10 mg) daily -> 1.5 tablets (15 mg) daily    Allergies reviewed with patient and updates made in EHR: yes    Medication History Completed By: Rosemary Jacobson 4/19/2025 9:44 AM    PTA Med List   Medication Sig Last Dose/Taking    alendronate (FOSAMAX) 70 MG tablet Take 70 mg by mouth every 7 days. on Tuesdays 4/15/2025    budesonide (RINOCORT AQUA) 32 MCG/ACT nasal spray Spray 1 spray into both nostrils daily 4/18/2025    buPROPion (WELLBUTRIN XL) 150 MG 24 hr tablet Take 150 mg by mouth every morning. (in addition to 300 mg tablet for daily total of 450 mg) 4/18/2025    buPROPion (WELLBUTRIN XL) 300 MG 24 hr tablet Take 1 tablet (300 mg) by mouth every morning (Patient taking differently: Take 300 mg by mouth every morning. (in addition to 150 mg tablet for daily total of 450 mg)) 4/18/2025    Calcium Carbonate-Vitamin D (CALCIUM + D PO) Take 1 tablet by mouth daily. (calcium 600 + vitamin D 400 IU) 4/18/2025    cetirizine (ZYRTEC) 10 MG tablet Take 10 mg by mouth daily. 4/18/2025    escitalopram (LEXAPRO) 10 MG tablet Take 1 tablet (10 mg) by mouth daily (Patient taking differently: Take 15 mg by mouth daily. (take 1.5 tablets daily)) 4/18/2025    ipratropium (ATROVENT) 0.03 % nasal spray Spray 2 sprays  into both nostrils daily. 4/18/2025    Multiple Vitamins-Minerals (ZINC PO) Take 1 tablet by mouth daily. *zinc supplement 4/18/2025    multivitamin w/minerals (THERA-VIT-M) tablet Take 1 tablet by mouth daily 4/18/2025    Omega-3 Fatty Acids (OMEGA-3 FISH OIL PO) Take 2 g by mouth daily  4/18/2025    Vitamin D-Vitamin K (VITAMIN K2-VITAMIN D3 PO) Take 1 tablet by mouth daily. 4/18/2025

## 2025-04-19 NOTE — ED NOTES
"[facility]  ED Nurse Handoff Report    ED Chief complaint: Abdominal Pain      ED Diagnosis:   Final diagnoses:   None       Code Status: Per admitting    Allergies:   Allergies   Allergen Reactions    Bacitracin-Neomycin-Polymyxin      PN: LW Reaction: Contact Dermatitis       Patient Story: LLQ abd pain and back pain starting last night. No vomiting, no urinary symptoms.   Focused Assessment:  Resting in bed, persistent LLQ abd pain    Treatments and/or interventions provided: Meds, imaging, labs  Patient's response to treatments and/or interventions: Tolerance    To be done/followed up on inpatient unit:  Per admitting    Does this patient have any cognitive concerns?: N/A    Activity level - Baseline/Home:  Independent  Activity Level - Current:   Independent    Patient's Preferred language: English   Needed?: No    Isolation: None  Infection: Not Applicable  Patient tested for COVID 19 prior to admission: NO  Bariatric?: No    Vital Signs:   Vitals:    04/18/25 2357 04/19/25 0530 04/19/25 0600   BP: (!) 154/91     Pulse: 82     Resp: 16     Temp: 97  F (36.1  C)     TempSrc: Temporal     SpO2: 97% 93% 92%   Weight: 93 kg (205 lb)     Height: 1.626 m (5' 4\")         Cardiac Rhythm:     Was the PSS-3 completed:   Yes  What interventions are required if any?               Family Comments: N/A    For the majority of the shift this patient's behavior was Green.   Behavioral interventions performed were Care and rounding.    ED NURSE PHONE NUMBER: *[phone]        "

## 2025-04-19 NOTE — CONSULTS
[facility] - Digestive Health Consultation     Faustina Morales  [address] [place] [address]  64 year old female     Admission Date/Time: 4/18/2025  Primary Care Provider: Meeta Bhardwaj  Referring / Attending Physician: Dr. Don     We were asked to see the patient in consultation by Dr. Don for evaluation of constipation.    ASSESSMENT:    64-year-old female presenting with left lower quadrant pain and large stool burden noted on CT.  Previously noted to have a tortuous colon last complete colonoscopy performed in 2019.  Subsequent CT Cologuard fee was negative in June 2024.  Suspect she has some degree of slow transit constipation and would likely benefit from an ongoing bowel regimen.  Started a bowel regimen this admission and we will continue to assess her response.    RECOMMENDATIONS:  -- Continue enemas twice daily  -- MiraLAX 2 times daily  -- Monitoring and documentation of bowel movements  -- GI will continue to follow    Thank you for involving us in this patient's care.  Please call me with any questions.    Total time spent in chart review, direct medical discussion, examination, and documentation was 37 minutes    Kemar Cota DO   [facility] - Digestive Health  Office [phone]    ________________________________________________________________________        CC: Constipation      HPI:  Faustina Morales is a 64 year old female who presents for left lower quadrant pain with evidence of significant stool burden and mesenteric stranding seen on CT scan.    She otherwise denies any going history of constipation does not take any stool softeners or laxatives.  Prior history of tortuous colon and failed colonoscopy in June 2024 although was able to reach the ascending colon. CT colography performed in June 2024 was unremarkable. Last complete colonoscopy performed in 2019 without any polyps identified although poor prep.  Otherwise denies any ongoing abdominal pain, melena or hematochezia.   She does not use NSAIDs.  Denies any family history of colon cancer.    Upon arrival, patient was hemodynamically stable.  CT scan demonstrated slight mesenteric stranding the left lower quadrant there is nonspecific as well as several loops of compartmentalized small bowel in the left lower abdomen raising some suspicion for possible nonobstructing internal hernia.  She was given 2 enemas in the ED with small amount of stool output.      ROS: A comprehensive ten point review of systems was negative aside from those in mentioned in the HPI.      PAST MEDICAL HISTORY:  Patient Active Problem List    Diagnosis Date Noted    Left lower quadrant abdominal pain 04/19/2025     Priority: Medium    Closed fracture of left hip, initial encounter (H) 02/03/2021     Priority: Medium    Melanoma in situ of face excluding eyelid, nose, lip, and ear (H) 12/22/2017     Priority: Medium    Allergic rhinitis due to pollen 12/18/2015     Priority: Medium    Recurrent major depressive disorder, in full remission 12/18/2015     Priority: Medium       SOCIAL HISTORY:  Social History     Tobacco Use    Smoking status: Never    Smokeless tobacco: Never   Substance Use Topics    Alcohol use: Yes     Alcohol/week: 21.0 standard drinks of alcohol     Types: 21 Glasses of wine per week     Comment: 3 glasses of wine/day    Drug use: No       FAMILY HISTORY:  Family History   Problem Relation Age of Onset    Alzheimer Disease Other     Diabetes Other     Hyperlipidemia Other     Hypertension Father     Heart Disease Father 50       ALLERGIES:   Allergies   Allergen Reactions    Bacitracin-Neomycin-Polymyxin      PN: LW Reaction: Contact Dermatitis     MEDICATIONS:   Current Facility-Administered Medications   Medication Dose Route Frequency Provider Last Rate Last Admin    bisacodyl (DULCOLAX) suppository 10 mg  10 mg Rectal Daily PRN Bartolo Don MD        buPROPion (WELLBUTRIN XL) 24 hr tablet 450 mg  450 mg Oral Bartolo Jacome  MD Juju   450 mg at 04/19/25 1332    Enema Compound (docusate/mineral oil/NaPhos) NO MAG CIT PREMIX  226 mL Rectal BID Ranjana Hester MD   226 mL at 04/19/25 1333    escitalopram (LEXAPRO) tablet 15 mg  15 mg Oral Daily Bartolo Don MD   15 mg at 04/19/25 1332    hydrALAZINE (APRESOLINE) injection 10 mg  10 mg Intravenous Q4H PRN Bartolo Don MD        HYDROmorphone (DILAUDID) half-tab 1 mg  1 mg Oral Q4H PRN Bartolo Don MD        HYDROmorphone (DILAUDID) injection 0.2 mg  0.2 mg Intravenous Q2H PRN Bartolo Don MD        HYDROmorphone (DILAUDID) injection 0.4 mg  0.4 mg Intravenous Q2H PRN Bartolo Don MD        HYDROmorphone (DILAUDID) tablet 2 mg  2 mg Oral Q4H PRN Bartolo Don MD   2 mg at 04/19/25 1107    lactated ringers infusion   Intravenous Continuous Bartolo Don  mL/hr at 04/19/25 1332 New Bag at 04/19/25 1332    polyethylene glycol (MIRALAX) Packet 17 g  17 g Oral BID Bartolo Don MD         Current Outpatient Medications   Medication Sig Dispense Refill    alendronate (FOSAMAX) 70 MG tablet Take 70 mg by mouth every 7 days. on Tuesdays      budesonide (RINOCORT AQUA) 32 MCG/ACT nasal spray Spray 1 spray into both nostrils daily 1 Bottle 3    buPROPion (WELLBUTRIN XL) 150 MG 24 hr tablet Take 150 mg by mouth every morning. (in addition to 300 mg tablet for daily total of 450 mg)      buPROPion (WELLBUTRIN XL) 300 MG 24 hr tablet Take 1 tablet (300 mg) by mouth every morning (Patient taking differently: Take 300 mg by mouth every morning. (in addition to 150 mg tablet for daily total of 450 mg)) 90 tablet 3    Calcium Carbonate-Vitamin D (CALCIUM + D PO) Take 1 tablet by mouth daily. (calcium 600 + vitamin D 400 IU)      cetirizine (ZYRTEC) 10 MG tablet Take 10 mg by mouth daily.      escitalopram (LEXAPRO) 10 MG tablet Take 1 tablet (10 mg) by mouth daily (Patient taking differently: Take 15 mg by mouth daily. (take  "1.5 tablets daily)) 90 tablet 3    ipratropium (ATROVENT) 0.03 % nasal spray Spray 2 sprays into both nostrils daily.      Multiple Vitamins-Minerals (ZINC PO) Take 1 tablet by mouth daily. *zinc supplement      multivitamin w/minerals (THERA-VIT-M) tablet Take 1 tablet by mouth daily      Omega-3 Fatty Acids (OMEGA-3 FISH OIL PO) Take 2 g by mouth daily       Vitamin D-Vitamin K (VITAMIN K2-VITAMIN D3 PO) Take 1 tablet by mouth daily.         PHYSICAL EXAM:   BP (!) 154/91   Pulse 82   Temp 97  F (36.1  C) (Temporal)   Resp 16   Ht 1.626 m (5' 4\")   Wt 93 kg (205 lb)   SpO2 92%   BMI 35.19 kg/m     GEN: Alert, oriented x3, communicative and in NAD.  MIKEY: AT, anicteric, OP without erythema, exudate, or ulcers.    NECK: Supple.    LYMPH: No LAD noted.  HRT: RRR  LUNGS: CTA  ABD: Mild ttp over LLQ, no rebound, no HSM.  SKIN: No rash, jaundice or spider angiomata  MSKL: LE free of edema, strength 5/5 all 4 extrems  NEURO: CN grossly intact, sensation intact to light touch, toes downgoing.     ADDITIONAL DATA:   I reviewed the patient's new clinical lab test results.   Recent Labs   Lab Test 04/19/25  0010 02/06/21  0751 02/05/21  0801 02/04/21  0712 02/03/21 2000   WBC 6.3  --   --  6.1 6.5   HGB 13.8 11.8 12.4 13.1 14.2   MCV 93  --   --  92 92     --   --  250 326   INR  --   --   --   --  0.95     Recent Labs   Lab Test 04/19/25  0010 02/05/21  0801 02/04/21  1047   POTASSIUM 3.9 3.8 4.1   CHLORIDE 101 100 98   CO2 24 31 28   BUN 14.9 8 11   ANIONGAP 14 5 5     Recent Labs   Lab Test 04/19/25  0335 04/19/25  0010   ALBUMIN  --  4.1   BILITOTAL  --  <0.2   ALT  --  22   AST  --  20   PROTEIN Negative  --    LIPASE  --  39     CT AP 4/18/25  1.  Slight mesenteric stranding in the left lower quadrant and minimal stranding lateral to the left colon, nonspecific. There are several loops of compartmentalized small bowel in the left lower abdomen. There is also slight tortuosity of colon at this   level. " Possibility of a nonobstructing internal hernia is not excluded. No bowel dilatation. Consider short-term follow-up if this corresponds with region of pain.  2.  8 mm right renal lesion measures higher density than simple cyst. Nonurgent ultrasound follow-up could be obtained for characterization.  3.  Fibroid uterus.  4.  Hepatic cysts.     CT colography 06/2024   Normal    Colonoscopy 06/2024  Advanced to the ascending colon; Unable to advance further due to tortuous colon. Examined mucosa normal.     Colonoscopy 2019  Redundant colon with suboptimal prep. AO and ICV were identified    I reviewed the patient's new imaging results.

## 2025-04-19 NOTE — CONSULTS
[facility] Surgery Consultation    Faustina Morales MRN# [id number]     Age: 64 year old   YOB: 1960       Date of Admission: 4/18/2025 11:57 PM      Reason for consult: Left lower abdominal pain       Requesting physician: Dr. Flores       Level of consult: Consult, follow and place orders      Assessment/Plan/Recommendations:    This 64 year old year old female has left sided abdominal pain, with imaging findings of mesenteric stranding and constipation on CT.  See full report.    Normal WBC, lactate and vitals; known to have a tortuous colon.  Last colonoscopy could not be completed.  There is a notable amount of stool in the right and transverse colon.  I recommend admission for BID enemas and monitoring her symptoms/labs.         Chief Complaint:     Left sided abdominal pain, no nausea/vomiting or diarrhea.  No prior episodes like this.  Last colonoscopy was last year and couldn't be completed.          Past Medical History:     Past Medical History:   Diagnosis Date    Allergic rhinitis due to pollen     Breast ductal hyperplasia, atypical     Tamoxifen X 5 years    Goiter 01/01/2012    multinodular. Saw endocrine. Had FNA    Hip fracture (H) 02/03/2021    Lichen sclerosus     Spinal stenosis     In neck. Diagnosed by MRI. Did PT and improved.             Past Surgical History:     Past Surgical History:   Procedure Laterality Date    BIOPSY BREAST      CLOSED REDUCTION, PERCUTANEOUS PINNING HIP Left 02/04/2021    Procedure: CLOSED REDUCTION LEFT FEMORAL NECK FRACTURE WITH PERCUTANEOUS PINNING.;  Surgeon: Pop Good MD;  Location:  OR    EYE SURGERY      retinal detachment    HIP SURGERY  02/04/2021    left hip surgery    PAROTIDECTOMY Left 2018                Family History:     Family History   Problem Relation Age of Onset    Alzheimer Disease Other     Diabetes Other     Hyperlipidemia Other     Hypertension Father     Heart Disease Father 50                 Medications:     Current Facility-Administered Medications   Medication Dose Route Frequency Provider Last Rate Last Admin    bisacodyl (DULCOLAX) suppository 10 mg  10 mg Rectal Daily PRN Bartolo Don MD        buPROPion (WELLBUTRIN XL) 24 hr tablet 300 mg  300 mg Oral QAM Bartolo Don MD        escitalopram (LEXAPRO) tablet 10 mg  10 mg Oral Daily Bartolo Don MD        hydrALAZINE (APRESOLINE) injection 10 mg  10 mg Intravenous Q4H PRN Bartolo Don MD        HYDROmorphone (DILAUDID) half-tab 1 mg  1 mg Oral Q4H PRN Bartolo Don MD        HYDROmorphone (DILAUDID) injection 0.2 mg  0.2 mg Intravenous Q2H PRN Bartolo Don MD        HYDROmorphone (DILAUDID) injection 0.4 mg  0.4 mg Intravenous Q2H PRN Bartolo Don MD        HYDROmorphone (DILAUDID) tablet 2 mg  2 mg Oral Q4H PRN Bartolo Don MD        lactated ringers infusion   Intravenous Continuous Bartolo Don MD        polyethylene glycol (MIRALAX) Packet 17 g  17 g Oral BID Bartolo Don MD         Current Outpatient Medications   Medication Sig Dispense Refill    alendronate (FOSAMAX) 70 MG tablet Take 70 mg by mouth every 7 days. on Tuesdays      budesonide (RINOCORT AQUA) 32 MCG/ACT nasal spray Spray 1 spray into both nostrils daily 1 Bottle 3    buPROPion (WELLBUTRIN XL) 150 MG 24 hr tablet Take 150 mg by mouth every morning. (in addition to 300 mg tablet for daily total of 450 mg)      buPROPion (WELLBUTRIN XL) 300 MG 24 hr tablet Take 1 tablet (300 mg) by mouth every morning (Patient taking differently: Take 300 mg by mouth every morning. (in addition to 150 mg tablet for daily total of 450 mg)) 90 tablet 3    Calcium Carbonate-Vitamin D (CALCIUM + D PO) Take 1 tablet by mouth daily. (calcium 600 + vitamin D 400 IU)      cetirizine (ZYRTEC) 10 MG tablet Take 10 mg by mouth daily.      escitalopram (LEXAPRO) 10 MG tablet Take 1 tablet (10 mg) by mouth daily (Patient  taking differently: Take 15 mg by mouth daily. (take 1.5 tablets daily)) 90 tablet 3    ipratropium (ATROVENT) 0.03 % nasal spray Spray 2 sprays into both nostrils daily.      Multiple Vitamins-Minerals (ZINC PO) Take 1 tablet by mouth daily. *zinc supplement      multivitamin w/minerals (THERA-VIT-M) tablet Take 1 tablet by mouth daily      Omega-3 Fatty Acids (OMEGA-3 FISH OIL PO) Take 2 g by mouth daily       Vitamin D-Vitamin K (VITAMIN K2-VITAMIN D3 PO) Take 1 tablet by mouth daily.               Review of Systems:     Review of system is negative but for HPI.    Abdomen is non-distended; tender to palpation along the left abdomen and pelvis.          Data:     Reviewed CT and labs.       Ranjana Hester MD

## 2025-04-19 NOTE — ED TRIAGE NOTES
Pt reports lower abdominal pain and back pain that started a couple hours ago with nausea. Denies vomiting or diarrhea. Denies fevers. Denies urinary symptoms     Triage Assessment (Adult)       Row Name 04/19/25 0006          Respiratory WDL    Respiratory WDL WDL        Cardiac WDL    Cardiac WDL WDL        Cognitive/Neuro/Behavioral WDL    Cognitive/Neuro/Behavioral WDL WDL

## 2025-04-19 NOTE — PROGRESS NOTES
RECEIVING UNIT ED HANDOFF REVIEW    ED Nurse Handoff Report was reviewed by: Marilee Ledesma RN on April 19, 2025 at 4:35 PM

## 2025-04-20 ENCOUNTER — APPOINTMENT (OUTPATIENT)
Dept: GENERAL RADIOLOGY | Facility: CLINIC | Age: 65
End: 2025-04-20
Attending: SURGERY
Payer: COMMERCIAL

## 2025-04-20 VITALS
WEIGHT: 210.1 LBS | DIASTOLIC BLOOD PRESSURE: 81 MMHG | TEMPERATURE: 97.8 F | OXYGEN SATURATION: 94 % | SYSTOLIC BLOOD PRESSURE: 127 MMHG | RESPIRATION RATE: 16 BRPM | HEIGHT: 64 IN | HEART RATE: 84 BPM | BODY MASS INDEX: 35.87 KG/M2

## 2025-04-20 LAB
ANION GAP SERPL CALCULATED.3IONS-SCNC: 10 MMOL/L (ref 7–15)
BACTERIA UR CULT: NORMAL
BUN SERPL-MCNC: 8.3 MG/DL (ref 8–23)
CALCIUM SERPL-MCNC: 8.9 MG/DL (ref 8.8–10.4)
CHLORIDE SERPL-SCNC: 101 MMOL/L (ref 98–107)
CREAT SERPL-MCNC: 0.82 MG/DL (ref 0.51–0.95)
EGFRCR SERPLBLD CKD-EPI 2021: 78 ML/MIN/1.73M2
ERYTHROCYTE [DISTWIDTH] IN BLOOD BY AUTOMATED COUNT: 12.8 % (ref 10–15)
GLUCOSE SERPL-MCNC: 101 MG/DL (ref 70–99)
HCO3 SERPL-SCNC: 29 MMOL/L (ref 22–29)
HCT VFR BLD AUTO: 42 % (ref 35–47)
HGB BLD-MCNC: 13.5 G/DL (ref 11.7–15.7)
MCH RBC QN AUTO: 30 PG (ref 26.5–33)
MCHC RBC AUTO-ENTMCNC: 32.1 G/DL (ref 31.5–36.5)
MCV RBC AUTO: 93 FL (ref 78–100)
PLATELET # BLD AUTO: 252 10E3/UL (ref 150–450)
POTASSIUM SERPL-SCNC: 4.3 MMOL/L (ref 3.4–5.3)
RBC # BLD AUTO: 4.5 10E6/UL (ref 3.8–5.2)
SODIUM SERPL-SCNC: 140 MMOL/L (ref 135–145)
WBC # BLD AUTO: 4.6 10E3/UL (ref 4–11)

## 2025-04-20 PROCEDURE — 36415 COLL VENOUS BLD VENIPUNCTURE: CPT | Performed by: INTERNAL MEDICINE

## 2025-04-20 PROCEDURE — 74019 RADEX ABDOMEN 2 VIEWS: CPT

## 2025-04-20 PROCEDURE — 250N000013 HC RX MED GY IP 250 OP 250 PS 637: Performed by: INTERNAL MEDICINE

## 2025-04-20 PROCEDURE — 82310 ASSAY OF CALCIUM: CPT | Performed by: INTERNAL MEDICINE

## 2025-04-20 PROCEDURE — 80048 BASIC METABOLIC PNL TOTAL CA: CPT | Performed by: INTERNAL MEDICINE

## 2025-04-20 PROCEDURE — 99239 HOSP IP/OBS DSCHRG MGMT >30: CPT | Performed by: STUDENT IN AN ORGANIZED HEALTH CARE EDUCATION/TRAINING PROGRAM

## 2025-04-20 PROCEDURE — G0378 HOSPITAL OBSERVATION PER HR: HCPCS

## 2025-04-20 PROCEDURE — 85014 HEMATOCRIT: CPT | Performed by: INTERNAL MEDICINE

## 2025-04-20 RX ADMIN — POLYETHYLENE GLYCOL 3350 17 G: 17 POWDER, FOR SOLUTION ORAL at 09:12

## 2025-04-20 RX ADMIN — BUPROPION HYDROCHLORIDE 450 MG: 150 TABLET, EXTENDED RELEASE ORAL at 09:12

## 2025-04-20 RX ADMIN — ESCITALOPRAM OXALATE 15 MG: 5 TABLET, FILM COATED ORAL at 09:12

## 2025-04-20 RX ADMIN — ACETAMINOPHEN 975 MG: 325 TABLET, FILM COATED ORAL at 13:14

## 2025-04-20 RX ADMIN — ACETAMINOPHEN 975 MG: 325 TABLET, FILM COATED ORAL at 09:12

## 2025-04-20 ASSESSMENT — ACTIVITIES OF DAILY LIVING (ADL)
ADLS_ACUITY_SCORE: 51
ADLS_ACUITY_SCORE: 57
ADLS_ACUITY_SCORE: 51
ADLS_ACUITY_SCORE: 57
ADLS_ACUITY_SCORE: 51
ADLS_ACUITY_SCORE: 57
ADLS_ACUITY_SCORE: 51
ADLS_ACUITY_SCORE: 57

## 2025-04-20 NOTE — PROGRESS NOTES
A&OX4, VSS on RA. Denies pain, n&V. All discharge instructions reviewed with pt, pt verbalized understanding. Stable at time of discharge. All belongings returned to pt.

## 2025-04-20 NOTE — DISCHARGE SUMMARY
"[facility]  Hospitalist Discharge Summary      Date of Admission:  4/18/2025  Date of Discharge:  4/20/2025  Discharging Provider: Ute Reyes MD  Discharge Service: Hospitalist Service    Discharge Diagnoses   LLQ pain  Constipation, rule out colonic obstruction  Right renal cyst  HTN  Anxiety    Clinically Significant Risk Factors     # Obesity: Estimated body mass index is 36.06 kg/m  as calculated from the following:    Height as of this encounter: 1.626 m (5' 4\").    Weight as of this encounter: 95.3 kg (210 lb 1.6 oz).       Follow-ups Needed After Discharge   Follow-up Appointments       Follow Up      Follow up with gastroenterology. [facility] clinic will contact you to schedule follow-up.        Hospital Follow-up with Existing Primary Care Provider (PCP)          Schedule Primary Care visit within: 30 Days               Discharge Disposition   Discharged to home  Condition at discharge: Stable    Hospital Course   Faustina Morales is a 64 year old female with PMH of HTN, anxiety, spinal stenosis, MORE, who presents with LLQ pain, possibly related to colonic obstruction.     LLQ pain  Constipation, rule out colonic obstruction  Patient presents with sudden onset of abdominal pain while sleeping the day before admission at 11pm. In ED noted LLQ pain radiating to the back, normal vital signs, normal BMP and CBC. Normal lactate.   *CT shows, \"Slight mesenteric stranding in the left lower quadrant and minimal stranding lateral to the left colon, nonspecific. There are several loops of compartmentalized small bowel in the left lower abdomen. There is also slight tortuosity of colon at this level. Possibility of a nonobstructing internal hernia is not excluded. No bowel dilatation. Consider short-term follow-up if this corresponds with region of pain.\"  * General surgery and GI consulted. Both concur her pain was likely from constipation and significant stool burden   * pt treated " with stool softeners, laxatives and enemas in the hospital and had significant stool output. At the time of discharge she is feeling much better with no significant abdominal pain.   - Discussed starting a bowel regimen at home with senna, miralax and metamucil. Pt will buy these medications over the counter.   - Pt will follow-up with [facility] in clinic.      Right renal cyst:  CT this admission shows subcentimeter right renal cyst, previous CT imaging June 2024 suggested a simple hemorrhagic cyst.  - Outpatient PCP follow up     HTN: /91 here, not on any antihypertensive  BP improved/normalized at the time of discharge. Anti-hypertensive not started  - Recommend outpatient PCP follow-up to monitor      Anxiety: Continue PTA Bupropion, Lexapro    Consultations This Hospital Stay   SURGERY GENERAL IP CONSULT  GASTROENTEROLOGY IP CONSULT    Code Status   Full Code    Time Spent on this Encounter   I, Ute Reyes MD, personally saw the patient today and spent greater than 30 minutes discharging this patient.       Ute Reyes MD  [facility] EXTENDED RECOVERY AND SHORT STAY  [address] [place] [id number]  Phone: [phone]  ______________________________________________________________________    Physical Exam   Vital Signs: Temp: 97.8  F (36.6  C) Temp src: Oral BP: 127/81 Pulse: 84   Resp: 16 SpO2: 94 % O2 Device: None (Room air)    Weight: 210 lbs 1.57 oz  Constitutional: Awake, alert, cooperative, no apparent distress.  Eyes: Conjunctiva and pupils examined and normal.  HEENT: Moist mucous membranes, normal dentition.  Respiratory: Non-labored breathing   Cardiovascular: Regular rate and rhythm  GI: Soft, non-distended, non-tender, normal bowel sounds.  Skin: No rashes, no cyanosis  Musculoskeletal: No joint swelling, erythema or tenderness.  Neurologic: Cranial nerves 2-12 intact, normal strength and sensation.  Psychiatric: Alert, oriented to person, place and  time, no obvious anxiety or depression.    Primary Care Physician   Meeta Bhardwaj    Discharge Orders      Reason for your hospital stay    You were hospitalized for abdominal pain, likely from severe constipation.     Activity    Your activity upon discharge: activity as tolerated     Follow Up    Follow up with gastroenterology. [facility] clinic will contact you to schedule follow-up.     Discharge Instructions    Recommend buying senna and miralax over the counter. You can also buy metamucil over the counter as well. Would aim for one soft but formed stool daily.     Diet    Follow this diet upon discharge: Current Diet:Orders Placed This Encounter      Regular Diet Adult     Hospital Follow-up with Existing Primary Care Provider (PCP)            Significant Results and Procedures   Most Recent 3 CBC's:  Recent Labs   Lab Test 04/20/25  0657 04/19/25  0010 02/06/21  0751 02/05/21  0801 02/04/21  0712   WBC 4.6 6.3  --   --  6.1   HGB 13.5 13.8 11.8   < > 13.1   MCV 93 93  --   --  92    274  --   --  250    < > = values in this interval not displayed.     Most Recent 3 BMP's:  Recent Labs   Lab Test 04/20/25  0657 04/19/25  0010 02/06/21  0751 02/05/21  0801    139  --  136   POTASSIUM 4.3 3.9  --  3.8   CHLORIDE 101 101  --  100   CO2 29 24  --  31   BUN 8.3 14.9  --  8   CR 0.82 0.79  --  0.72   ANIONGAP 10 14  --  5   ELOISE 8.9 9.1  --  9.0   * 110* 101* 91   ,   Results for orders placed or performed during the hospital encounter of 04/18/25   CT Abdomen Pelvis w Contrast    Narrative    EXAM: CT ABDOMEN PELVIS W CONTRAST  LOCATION: [facility]  DATE: 4/19/2025    INDICATION: llq and L cva ttp  COMPARISON: 06/13/2024  TECHNIQUE: CT scan of the abdomen and pelvis was performed following injection of IV contrast. Multiplanar reformats were obtained. Dose reduction techniques were used.  CONTRAST: 103mL Isovue 370    FINDINGS:   LOWER CHEST: Basilar  atelectasis.    HEPATOBILIARY: Hepatic cysts. Subcentimeter hypodensities small for characterization.    PANCREAS: Normal.    SPLEEN: Normal.    ADRENAL GLANDS: Normal.    KIDNEYS/BLADDER: 8 mm lesion posterior right kidney S4, 7 1 measures higher density than simple cyst    BOWEL: Normal caliber. There is slight mesenteric stranding in the left lower quadrant series 4 images 120 - 130 and minimal stranding lateral to the left colon. Several loops of small bowel are slightly compartmentalized in the left lower quadrant near   the stranding series 4 images 85-1 20. Appendix is normal.    LYMPH NODES: Normal.    VASCULATURE: Normal.    PELVIC ORGANS: Fibroid uterus    MUSCULOSKELETAL: Left hip arthroplasty. Degenerative change osseous structures.      Impression    IMPRESSION:   1.  Slight mesenteric stranding in the left lower quadrant and minimal stranding lateral to the left colon, nonspecific. There are several loops of compartmentalized small bowel in the left lower abdomen. There is also slight tortuosity of colon at this   level. Possibility of a nonobstructing internal hernia is not excluded. No bowel dilatation. Consider short-term follow-up if this corresponds with region of pain.  2.  8 mm right renal lesion measures higher density than simple cyst. Nonurgent ultrasound follow-up could be obtained for characterization.  3.  Fibroid uterus.  4.  Hepatic cysts.     XR Abdomen 2 Views    Narrative    EXAM: XR ABDOMEN 2 VIEWS  LOCATION: [facility]  DATE: 4/20/2025    INDICATION: stool burden interval change  COMPARISON: 4/19/2025      Impression    IMPRESSION: Mild to moderate increased stool within the colon, similar to yesterday. Small bowel loops are normal caliber. No free air. Left hip replacement.       Discharge Medications   Current Discharge Medication List        CONTINUE these medications which have NOT CHANGED    Details   alendronate (FOSAMAX) 70 MG tablet Take 70 mg by  mouth every 7 days. on Tuesdays      budesonide (RINOCORT AQUA) 32 MCG/ACT nasal spray Spray 1 spray into both nostrils daily  Qty: 1 Bottle, Refills: 3    Associated Diagnoses: Allergic rhinitis due to pollen      !! buPROPion (WELLBUTRIN XL) 150 MG 24 hr tablet Take 150 mg by mouth every morning. (in addition to 300 mg tablet for daily total of 450 mg)      !! buPROPion (WELLBUTRIN XL) 300 MG 24 hr tablet Take 1 tablet (300 mg) by mouth every morning  Qty: 90 tablet, Refills: 3    Associated Diagnoses: Recurrent major depressive disorder, in full remission      Calcium Carbonate-Vitamin D (CALCIUM + D PO) Take 1 tablet by mouth daily. (calcium 600 + vitamin D 400 IU)      cetirizine (ZYRTEC) 10 MG tablet Take 10 mg by mouth daily.      escitalopram (LEXAPRO) 10 MG tablet Take 1 tablet (10 mg) by mouth daily  Qty: 90 tablet, Refills: 3    Associated Diagnoses: Recurrent major depressive disorder, in full remission      ipratropium (ATROVENT) 0.03 % nasal spray Spray 2 sprays into both nostrils daily.      Multiple Vitamins-Minerals (ZINC PO) Take 1 tablet by mouth daily. *zinc supplement      multivitamin w/minerals (THERA-VIT-M) tablet Take 1 tablet by mouth daily      Omega-3 Fatty Acids (OMEGA-3 FISH OIL PO) Take 2 g by mouth daily       Vitamin D-Vitamin K (VITAMIN K2-VITAMIN D3 PO) Take 1 tablet by mouth daily.       !! - Potential duplicate medications found. Please discuss with provider.        Allergies   Allergies   Allergen Reactions    Bacitracin-Neomycin-Polymyxin      PN: LW Reaction: Contact Dermatitis

## 2025-04-20 NOTE — PLAN OF CARE
Goal Outcome Evaluation:      Plan of Care Reviewed With: patient    Overall Patient Progress: improvingOverall Patient Progress: improving           Observation Goals (met & not met): not met    Observation goals  PRIOR TO DISCHARGE       -diagnostic tests and consults completed and resulted-not met.  -vital signs normal or at patient baseline-met.  Nurse to notify provider when observation goals have been met and patient is ready for discharge.    Activity Level: Independent  Fall Risk: N  Behavior & Aggression Tool Color: Green  Pain Management: abdomen tender, PRN dilaudid and tylenol available  ABNL VS/O2: VSS on room air  ABNL Lab/BG:   Diet: clear liquids  Bowel/Bladder: continent, having small BM's, had 2 pink lady enemas in ED  Drains/Devices: L AC IV infusing  Tests/Procedures for next shift: tap water enema  Anticipated DC date: -pending  Other Important Info: GI consulted

## 2025-04-20 NOTE — PROGRESS NOTES
"[facility] Progress Note     IMPRESSION:  64-year-old female presenting with left lower quadrant pain and large stool burden noted on CT.  Previously noted to have a tortuous colon last complete colonoscopy performed in .  Subsequent CT colography was negative in 2024.  Suspect she has some degree of slow transit constipation and would likely benefit from an ongoing bowel regimen.  Pain has resolved after beginning bowel regimen.     Multiple hepatic cysts noted on CT, measuring up to 3.8 cm on my review. Dedicated hepatic imaging such as liver MRI can be considered as an outpatient.     RECOMMENDATIONS:  -- Continue miralax twice daily on discharge   -- Non-urgent liver MRI as an outpatient  -- We will arrange for outpatient follow up in GI clinic  -- GI will sign off at this time.     Thank you for allowing us to participate in the care of this patient.  Please do not hesitate to reach out if any additional questions or concerns arise.    Total time spent in chart review, direct medical discussion, examination, and documentation was 29 minutes    Kemar Cota DO   [facility]  Office [phone]    ________________________________________________________________________      SUBJECTIVE: Patient reports several small, loose bowel movements.  Received 2 enemas yesterday and 1 dose of MiraLAX yesterday evening.  Her abdominal pain has since resolved and denies any nausea or vomiting.       OBJECTIVE:  BP (!) 155/94 (BP Location: Left arm)   Pulse 66   Temp 98  F (36.7  C) (Oral)   Resp 16   Ht 1.626 m (5' 4\")   Wt 95.3 kg (210 lb 1.6 oz)   SpO2 95%   BMI 36.06 kg/m    Temp (24hrs), Av.7  F (36.5  C), Min:97.4  F (36.3  C), Max:98  F (36.7  C)    Patient Vitals for the past 72 hrs:   Weight   25 1705 95.3 kg (210 lb 1.6 oz)   25 2357 93 kg (205 lb)       Intake/Output Summary (Last 24 hours) at 2025 0637  Last data filed at 2025 1745  Gross per 24 " hour   Intake 350 ml   Output --   Net 350 ml        PHYSICAL EXAM  GEN: Alert, oriented x3, communicative and in NAD.    LYMPH: No LAD noted.  HRT: RRR  LUNGS: CTA  ABD: ND, +BS, no guarding or pain to palpation, no rebound, no HSM.  SKIN: No rash, jaundice or spider angiomata      Additional Data:  I have reviewed the patient's new clinical lab results:     Recent Labs   Lab Test 04/19/25  0010 02/06/21  0751 02/05/21  0801 02/04/21  0712 02/03/21 2000   WBC 6.3  --   --  6.1 6.5   HGB 13.8 11.8 12.4 13.1 14.2   MCV 93  --   --  92 92     --   --  250 326   INR  --   --   --   --  0.95     Recent Labs   Lab Test 04/19/25  0010 02/05/21  0801 02/04/21  1047   POTASSIUM 3.9 3.8 4.1   CHLORIDE 101 100 98   CO2 24 31 28   BUN 14.9 8 11   ANIONGAP 14 5 5     Recent Labs   Lab Test 04/19/25  0335 04/19/25  0010   ALBUMIN  --  4.1   BILITOTAL  --  <0.2   ALT  --  22   AST  --  20   PROTEIN Negative  --    LIPASE  --  39     CT AP 4/18/25  1.  Slight mesenteric stranding in the left lower quadrant and minimal stranding lateral to the left colon, nonspecific. There are several loops of compartmentalized small bowel in the left lower abdomen. There is also slight tortuosity of colon at this level. Possibility of a nonobstructing internal hernia is not excluded. No bowel dilatation. Consider short-term follow-up if this corresponds with region of pain.  2.  8 mm right renal lesion measures higher density than simple cyst. Nonurgent ultrasound follow-up could be obtained for characterization.  3.  Fibroid uterus.  4.  Hepatic cysts.     CT colography 06/2024   Normal     Colonoscopy 06/2024  Advanced to the ascending colon; Unable to advance further due to tortuous colon. Examined mucosa normal.      Colonoscopy 2019  Redundant colon with suboptimal prep. AO and ICV were identified

## 2025-04-20 NOTE — PROGRESS NOTES
Obs goals  -diagnostic tests and consults completed and resulted not met  -vital signs normal or at patient baseline met

## 2025-04-20 NOTE — PROGRESS NOTES
Observation goals  PRIOR TO DISCHARGE       Comments: -diagnostic tests and consults completed and resulted- not met  -vital signs normal or at patient baseline- met  Nurse to notify provider when observation goals have been met and patient is ready for discharge.

## 2025-04-20 NOTE — PLAN OF CARE
Goal Outcome Evaluation:      Plan of Care Reviewed With: patient    Overall Patient Progress: no changeOverall Patient Progress: no change         PRIMARY Concern: abdominal pain and constipation  SAFETY RISK Concerns (fall risk, behaviors, etc.): fall      Aggression Tool Color: green  Isolation/Type: na  Tests/Procedures for NEXT shift: tap water enema  Consults? (Pending/following, signed-off?) GI following  Where is patient from? (Home, TCU, etc.): home  Other Important info for NEXT shift: logging all bms  Anticipated DC date & active delays: TBD  _____________________________________________________________________________  SUMMARY NOTE:   Orientation/Cognitive: A&Ox4  Observation Goals (Met/ Not Met): not met  Mobility Level/Assist Equipment: IND  Antibiotics & Plan (IV/po, length of tx left): na  Pain Management: declines pain meds  Complete Pain Reassessment: Y/N NA Due next: next shift  Tele/VS/O2: VSS, HTN at times  ABNL Lab/BG: CT shows tortuous colon with large stool burden  Diet: clear liquid  Bowel/Bladder: bowel shows lg stool burden. 2 enemas given with good results  Skin Concerns: WNL  Drains/Devices: piv  Patient Stated Goal for Today: rest

## (undated) DEVICE — GLOVE PROTEXIS POWDER FREE 7.5 ORTHOPEDIC 2D73ET75

## (undated) DEVICE — SU VICRYL 0 CT-1 27" J340H

## (undated) DEVICE — SU VICRYL 2-0 CT-1 27" UND J259H

## (undated) DEVICE — PACK HIP NAILING SOP15HNSB

## (undated) DEVICE — LINEN TOWEL PACK X5 5464

## (undated) DEVICE — GLOVE PROTEXIS BLUE W/NEU-THERA 8.0  2D73EB80

## (undated) DEVICE — Device

## (undated) DEVICE — SOL WATER IRRIG 1000ML BOTTLE 2F7114

## (undated) DEVICE — SOL NACL 0.9% IRRIG 1000ML BOTTLE 2F7124

## (undated) DEVICE — GLOVE PROTEXIS POWDER FREE 8.0 ORTHOPEDIC 2D73ET80

## (undated) DEVICE — ESU GROUND PAD ADULT W/CORD E7507

## (undated) DEVICE — DRAPE C-ARM 60X42" 1013

## (undated) DEVICE — DRSG XEROFORM 5X9" 8884431605

## (undated) DEVICE — PREP CHLORAPREP 26ML TINTED ORANGE  260815

## (undated) DEVICE — SU MONOCRYL 3-0 PS-2 27" Y427H

## (undated) RX ORDER — PROPOFOL 10 MG/ML
INJECTION, EMULSION INTRAVENOUS
Status: DISPENSED
Start: 2021-02-04

## (undated) RX ORDER — LIDOCAINE HYDROCHLORIDE 20 MG/ML
INJECTION, SOLUTION EPIDURAL; INFILTRATION; INTRACAUDAL; PERINEURAL
Status: DISPENSED
Start: 2021-02-04

## (undated) RX ORDER — BUPIVACAINE HYDROCHLORIDE 5 MG/ML
INJECTION, SOLUTION EPIDURAL; INTRACAUDAL
Status: DISPENSED
Start: 2021-02-04

## (undated) RX ORDER — ONDANSETRON 2 MG/ML
INJECTION INTRAMUSCULAR; INTRAVENOUS
Status: DISPENSED
Start: 2021-02-04

## (undated) RX ORDER — HYDROMORPHONE HYDROCHLORIDE 1 MG/ML
INJECTION, SOLUTION INTRAMUSCULAR; INTRAVENOUS; SUBCUTANEOUS
Status: DISPENSED
Start: 2021-02-04

## (undated) RX ORDER — FENTANYL CITRATE 50 UG/ML
INJECTION, SOLUTION INTRAMUSCULAR; INTRAVENOUS
Status: DISPENSED
Start: 2021-02-04

## (undated) RX ORDER — DEXAMETHASONE SODIUM PHOSPHATE 4 MG/ML
INJECTION, SOLUTION INTRA-ARTICULAR; INTRALESIONAL; INTRAMUSCULAR; INTRAVENOUS; SOFT TISSUE
Status: DISPENSED
Start: 2021-02-04

## (undated) RX ORDER — CEFAZOLIN SODIUM 2 G/100ML
INJECTION, SOLUTION INTRAVENOUS
Status: DISPENSED
Start: 2021-02-04

## (undated) RX ORDER — LABETALOL HYDROCHLORIDE 5 MG/ML
INJECTION, SOLUTION INTRAVENOUS
Status: DISPENSED
Start: 2021-02-04